# Patient Record
Sex: MALE | Employment: UNEMPLOYED
[De-identification: names, ages, dates, MRNs, and addresses within clinical notes are randomized per-mention and may not be internally consistent; named-entity substitution may affect disease eponyms.]

---

## 2022-07-30 ENCOUNTER — TELEPHONE ENCOUNTER (OUTPATIENT)
Age: 72
End: 2022-07-30

## 2022-07-31 ENCOUNTER — TELEPHONE ENCOUNTER (OUTPATIENT)
Age: 72
End: 2022-07-31

## 2023-06-23 LAB — URINE CULTURE: NO GROWTH

## 2023-06-24 LAB — STAPH/MRSA SCREEN, CULTURE: ABNORMAL

## 2023-07-06 ENCOUNTER — HOSPITAL ENCOUNTER (OUTPATIENT)
Dept: DATA CONVERSION | Facility: HOSPITAL | Age: 73
End: 2023-07-06
Attending: UROLOGY | Admitting: UROLOGY
Payer: MEDICARE

## 2023-07-06 DIAGNOSIS — N48.6 INDURATION PENIS PLASTICA: ICD-10-CM

## 2023-07-06 DIAGNOSIS — K21.9 GASTRO-ESOPHAGEAL REFLUX DISEASE WITHOUT ESOPHAGITIS: ICD-10-CM

## 2023-07-06 DIAGNOSIS — N52.9 MALE ERECTILE DYSFUNCTION, UNSPECIFIED: ICD-10-CM

## 2023-07-06 DIAGNOSIS — E78.5 HYPERLIPIDEMIA, UNSPECIFIED: ICD-10-CM

## 2023-07-06 DIAGNOSIS — I10 ESSENTIAL (PRIMARY) HYPERTENSION: ICD-10-CM

## 2023-08-28 PROBLEM — H90.0 CONDUCTIVE HEARING LOSS, BILATERAL: Status: ACTIVE | Noted: 2023-08-28

## 2023-08-28 PROBLEM — K57.30 DIVERTICULOSIS OF LARGE INTESTINE: Status: ACTIVE | Noted: 2023-08-28

## 2023-08-28 PROBLEM — E78.5 HLD (HYPERLIPIDEMIA): Status: ACTIVE | Noted: 2023-08-28

## 2023-08-28 PROBLEM — R73.01 IMPAIRED FASTING GLUCOSE: Status: ACTIVE | Noted: 2023-08-28

## 2023-08-28 PROBLEM — E78.00 PURE HYPERCHOLESTEROLEMIA: Status: ACTIVE | Noted: 2023-08-28

## 2023-08-28 PROBLEM — R13.19 ESOPHAGEAL DYSPHAGIA: Status: ACTIVE | Noted: 2023-08-28

## 2023-08-28 PROBLEM — J31.0 CHRONIC RHINITIS: Status: ACTIVE | Noted: 2023-08-28

## 2023-08-28 PROBLEM — N52.9 ERECTILE DYSFUNCTION: Status: ACTIVE | Noted: 2023-08-28

## 2023-08-28 PROBLEM — I10 ESSENTIAL HYPERTENSION: Status: ACTIVE | Noted: 2023-08-28

## 2023-08-28 PROBLEM — N48.6 PEYRONIE DISEASE: Status: ACTIVE | Noted: 2023-08-28

## 2023-08-28 PROBLEM — H61.23 BILATERAL IMPACTED CERUMEN: Status: ACTIVE | Noted: 2023-08-28

## 2023-08-28 PROBLEM — K21.00 GASTRO-ESOPHAGEAL REFLUX DISEASE WITH ESOPHAGITIS, WITHOUT BLEEDING: Status: ACTIVE | Noted: 2023-08-28

## 2023-08-28 RX ORDER — AMLODIPINE BESYLATE 5 MG/1
1 TABLET ORAL DAILY
COMMUNITY
Start: 2021-10-06 | End: 2023-11-01 | Stop reason: ALTCHOICE

## 2023-08-28 RX ORDER — TADALAFIL 20 MG/1
1 TABLET ORAL
COMMUNITY
Start: 2021-06-17 | End: 2023-11-01 | Stop reason: ALTCHOICE

## 2023-08-28 RX ORDER — AZELASTINE 1 MG/ML
1 SPRAY, METERED NASAL 2 TIMES DAILY
COMMUNITY
Start: 2023-04-26

## 2023-08-28 RX ORDER — PRAVASTATIN SODIUM 40 MG/1
1 TABLET ORAL NIGHTLY
COMMUNITY
End: 2024-01-25

## 2023-08-28 RX ORDER — LOSARTAN POTASSIUM AND HYDROCHLOROTHIAZIDE 25; 100 MG/1; MG/1
1 TABLET ORAL DAILY
COMMUNITY
End: 2023-11-01 | Stop reason: ALTCHOICE

## 2023-08-28 RX ORDER — FLUTICASONE PROPIONATE 50 MCG
1 SPRAY, SUSPENSION (ML) NASAL DAILY
COMMUNITY
Start: 2021-10-06

## 2023-08-28 RX ORDER — DIPHENHYDRAMINE HCL 25 MG
1 TABLET ORAL NIGHTLY
COMMUNITY
Start: 2021-03-29 | End: 2023-11-01 | Stop reason: ALTCHOICE

## 2023-08-28 RX ORDER — TADALAFIL 5 MG/1
1 TABLET ORAL DAILY
COMMUNITY
Start: 2023-05-18 | End: 2023-11-01 | Stop reason: ALTCHOICE

## 2023-08-28 RX ORDER — LORATADINE 10 MG/1
1 TABLET ORAL DAILY
COMMUNITY

## 2023-08-28 RX ORDER — OMEPRAZOLE 20 MG/1
1 CAPSULE, DELAYED RELEASE ORAL DAILY
COMMUNITY
End: 2024-01-25

## 2023-08-28 RX ORDER — OMEPRAZOLE 40 MG/1
1 CAPSULE, DELAYED RELEASE ORAL DAILY
COMMUNITY
Start: 2021-03-29 | End: 2023-11-01 | Stop reason: ALTCHOICE

## 2023-08-28 RX ORDER — AMLODIPINE BESYLATE 2.5 MG/1
10 TABLET ORAL DAILY
COMMUNITY
Start: 2023-01-20 | End: 2023-11-01 | Stop reason: ALTCHOICE

## 2023-09-29 VITALS — HEIGHT: 70 IN | WEIGHT: 194.45 LBS | BODY MASS INDEX: 27.84 KG/M2

## 2023-09-30 NOTE — H&P
History & Physical Reviewed:   I have reviewed the History and Physical dated:  06-Jul-2023   History and Physical reviewed and relevant findings noted. Patient examined to review pertinent physical  findings.: No significant changes   Home Medications Reviewed: no changes noted   Allergies Reviewed: no changes noted       ERAS (Enhanced Recovery After Surgery):  ·  ERAS Patient: no     Consent:   COVID-19 Consent:  ·  COVID-19 Risk Consent Surgeon has reviewed key risks related to the risk of zunilda COVID-19 and if they contract COVID-19 what the risks are.     Attestation:   Note Completion:  I am a:  Resident/Fellow   Attending Attestation I saw and evaluated the patient.  I personally obtained the key and critical portions of the history and physical exam or was physically present for key and  critical portions performed by the resident/fellow. I reviewed the resident/fellow?s documentation and discussed the patient with the resident/fellow.  I agree with the resident/fellow?s medical decision making as documented in the note.     I personally evaluated the patient on 06-Jul-2023         Electronic Signatures:  José Antonio Mayer (Resident))  (Signed 06-Jul-2023 06:51)   Authored: History & Physical Reviewed, ERAS, Consent,  Note Completion  Ashely Duckworth)  (Signed 06-Jul-2023 19:19)   Authored: Note Completion   Co-Signer: History & Physical Reviewed, ERAS, Consent, Note Completion      Last Updated: 06-Jul-2023 19:19 by Ashely Duckworth)

## 2023-10-02 NOTE — OP NOTE
PROCEDURE DETAILS    Preoperative Diagnosis:  erectile dysfunction  Peyronies disease   Postoperative Diagnosis:  erectile dysfunction  peyronies disease   Surgeon: kyra  Resident/Fellow/Other Assistant: quyen    Procedure:  infrapubic inflatable penile prosthesis placement  (Coloplast Titan)  Penile injection for artificial erection  Anesthesia: lma  Estimated Blood Loss: 5  Blood Replaced: none  Findings: well seated and aesthetic placement of infrapubic IPPand reervoir  Specimens(s) Collected: no,     Complications: none  IV Fluids: per anesthesia  Urine Output: 400cc  Drains and/or Catheters: flat MARLON to right suprapubic region  Implants: 18 + 1 RTE bilaterally / 125cc (with/75cc) reservoir in left space of retzius  Additional Details: pt to be discharged  home today  to void in pacu prior to discharge  home with MARLON drain and f/u Monday for drain removal  Patient Returned To/Condition: pacu        Operative Report:   PREOPERATIVE DIAGNOSIS: Erectile Dysfunction  POSTOPERATIVE DIAGNOSIS: Erectile Dysfunction    PROCEDURE:  Placement of Coloplast Titan 3-piece inflatable penile prosthesis.     Cylinders:  18 cm     Rear tips: 1 cm bilaterally     Reservoir: 125 mL    Site: left space of Retzius    INDICATIONS:  The patient is a 73 year old male with a history of severe vasculogenic erectile dysfunction refractory to conservative management with PDE 5 inhibitors, VCD, and intracavernosal therapy. Patient decided to undergo definitive surgical management  with inflatable penile prosthesis. The risks (infection, erosion, decrease penile size, damage to other organs, device mechanical failure and need for replacement, etc) and benefits were explained and all questions and concerns were addressed.    OPERATIVE DETAILS:  The patient was placed on table in supine position.  Anesthesia was administered without incident.  Genitalia was then shaved and then prepped with ChloraPrep after a Hibiclens  wash.  A bilateral pudendal nerve block was performed with 20 cc of 0.25%  Marcaine and decadron mix. An artificial erection was then induced using injection of lidocaine and injectable saline.  Left lateral curvature was noted.       A 16 fr villalpando catheter was then placed.   A transverse infrapubic incision was then made using a scalpel and electrocautery and dissected down through dartos down to the corporal bodies bilaterally which were cleared bluntly. 2-0 vicryl stay sutures  were placed in each corporal body, 1 medially and 1 laterally assuring that we were lateral to the dorsal nerve bundles.  A 1 cm corporotomy was then made between the stay stitches first on the left side and then on the right.  We then dilated the corpora  using the anup proximally and distally and dilated quite nicely. Using the Anup device, we then measured our corpora on the left side, it was 11 cm proximal and 8 distal for a total of 19 cm. On the right side, it was 10 cm proximal and 9 cm distal  for a total of 19 cm.  The wound was then copiously irrigated with Irricept irrigation.    Incision was then made to prep the coloplast titan 18 cm implant with 1 cm rear-tip extenders as well as 125cc reservoir.    We then turned our attention to development of the space for the reservoir. The decision was then to go on the left side in the space of retzius position. We then identified the external ring.  The appendiceal retractor was then placed in the left external  ring, and we were able to bluntly dissect into the space of retzius.  We were able to get a nice high 125 cc reservoir was then placed in this position.  It was filled with 75cc and seated quite nicely.    We then turned our attention to placement of the cylinders.  Once they had been prepped using Braxton needle and Anup device, the cylinders were deployed within the corporal bodies and it seated quite nicely.  A surrogate reservoir test was then performed  and tips were  symmetrical in mid glans.  No evidence of any crossover perforation or any other pathology.  It seated quite nicely.  Implant was then deflated.  The tubing from the reservoir was connected to the pump using the quick connector set.  Our  pre-placed corporotomy stitches were then tied down with nice closure of the corpora.  A subdartos pouch was created for placement of the pump. The pump was placed in a dependent position. A MARLON drain was placed into the left groin.  We then proceeded  with wound closure.  The deeper layers were closed with 2-0 Vicryl.  The skin was closed with subcuticular 4-0 Monocryl.  Skin glue was then applied. An island dressing, drain dressing was then applied, followed by fluffs and scrotal support.  The patient's  Aguilar catheter was removed.    The patient was awakened and transferred to PACU in stable condition.  .                        Attestation:   Note Completion:  Attending Attestation I was present for the entire procedure    I am a: Resident/Fellow         Electronic Signatures:  José Antonio Mayer (Resident))  (Signed 06-Jul-2023 12:23)   Authored: Post-Operative Note, Chart Review, Note Completion  Ashely Duckworth)  (Signed 06-Jul-2023 19:55)   Authored: Post-Operative Note, Chart Review, Note Completion   Co-Signer: Post-Operative Note, Chart Review, Note Completion      Last Updated: 06-Jul-2023 19:55 by Ashely Duckworth)

## 2023-11-01 ENCOUNTER — OFFICE VISIT (OUTPATIENT)
Dept: PRIMARY CARE | Facility: CLINIC | Age: 73
End: 2023-11-01
Payer: MEDICARE

## 2023-11-01 VITALS
DIASTOLIC BLOOD PRESSURE: 91 MMHG | TEMPERATURE: 97.5 F | HEIGHT: 71 IN | WEIGHT: 201 LBS | HEART RATE: 85 BPM | SYSTOLIC BLOOD PRESSURE: 146 MMHG | BODY MASS INDEX: 28.14 KG/M2 | OXYGEN SATURATION: 96 %

## 2023-11-01 DIAGNOSIS — R73.01 IMPAIRED FASTING BLOOD SUGAR: ICD-10-CM

## 2023-11-01 DIAGNOSIS — E78.00 PURE HYPERCHOLESTEROLEMIA: ICD-10-CM

## 2023-11-01 DIAGNOSIS — I10 BENIGN HYPERTENSION: Primary | ICD-10-CM

## 2023-11-01 DIAGNOSIS — Z12.5 SPECIAL SCREENING, PROSTATE CANCER: ICD-10-CM

## 2023-11-01 DIAGNOSIS — K21.00 GASTRO-ESOPHAGEAL REFLUX DISEASE WITH ESOPHAGITIS, WITHOUT BLEEDING: ICD-10-CM

## 2023-11-01 DIAGNOSIS — Z11.59 ENCOUNTER FOR HEPATITIS C SCREENING TEST FOR LOW RISK PATIENT: ICD-10-CM

## 2023-11-01 PROCEDURE — 1036F TOBACCO NON-USER: CPT | Performed by: INTERNAL MEDICINE

## 2023-11-01 PROCEDURE — 3077F SYST BP >= 140 MM HG: CPT | Performed by: INTERNAL MEDICINE

## 2023-11-01 PROCEDURE — 99214 OFFICE O/P EST MOD 30 MIN: CPT | Performed by: INTERNAL MEDICINE

## 2023-11-01 PROCEDURE — 1126F AMNT PAIN NOTED NONE PRSNT: CPT | Performed by: INTERNAL MEDICINE

## 2023-11-01 PROCEDURE — 3080F DIAST BP >= 90 MM HG: CPT | Performed by: INTERNAL MEDICINE

## 2023-11-01 PROCEDURE — 1159F MED LIST DOCD IN RCRD: CPT | Performed by: INTERNAL MEDICINE

## 2023-11-01 RX ORDER — LOSARTAN POTASSIUM AND HYDROCHLOROTHIAZIDE 25; 100 MG/1; MG/1
1 TABLET ORAL DAILY
COMMUNITY
End: 2024-03-07

## 2023-11-01 RX ORDER — TRAMADOL HYDROCHLORIDE 50 MG/1
50 TABLET ORAL EVERY 6 HOURS PRN
COMMUNITY
Start: 2023-07-26 | End: 2023-11-01 | Stop reason: ALTCHOICE

## 2023-11-01 RX ORDER — POLYETHYLENE GLYCOL 3350 17 G/17G
POWDER, FOR SOLUTION ORAL
COMMUNITY
Start: 2023-07-06 | End: 2023-11-01 | Stop reason: ALTCHOICE

## 2023-11-01 RX ORDER — AMLODIPINE BESYLATE 10 MG/1
10 TABLET ORAL DAILY
Qty: 90 TABLET | Refills: 2 | Status: SHIPPED | OUTPATIENT
Start: 2023-11-01 | End: 2024-10-31

## 2023-11-01 RX ORDER — AMLODIPINE BESYLATE 5 MG/1
TABLET ORAL DAILY
COMMUNITY
End: 2023-11-01 | Stop reason: DRUGHIGH

## 2023-11-01 ASSESSMENT — ENCOUNTER SYMPTOMS
SHORTNESS OF BREATH: 0
DEPRESSION: 0
OCCASIONAL FEELINGS OF UNSTEADINESS: 0
PALPITATIONS: 0
LOSS OF SENSATION IN FEET: 0

## 2023-11-01 ASSESSMENT — PATIENT HEALTH QUESTIONNAIRE - PHQ9
SUM OF ALL RESPONSES TO PHQ9 QUESTIONS 1 AND 2: 0
1. LITTLE INTEREST OR PLEASURE IN DOING THINGS: NOT AT ALL
2. FEELING DOWN, DEPRESSED OR HOPELESS: NOT AT ALL

## 2023-11-01 ASSESSMENT — PAIN SCALES - GENERAL: PAINLEVEL: 0-NO PAIN

## 2023-11-01 NOTE — PATIENT INSTRUCTIONS
Shingrix shingles vaccine series of 2  by 2 to 3 months, COVID 19 new vaccine 10-11/23.  Can be obtained at the local pharmacies.      Get labs done 1 week prior to follow up.    Diagnoses and all orders for this visit:  Benign hypertension  Comments:  Ideal BP  less than 130/80. Increase amlodipine 10 mg( low risk of cosntipation, feet/ankle swelling) usually well tolerated. Losartan/HCTZ 100/25 stays same.  Orders:  -     amLODIPine (Norvasc) 10 mg tablet; Take 1 tablet (10 mg) by mouth once daily.  -     Comprehensive Metabolic Panel; Future  Impaired fasting blood sugar  Comments:  Blood sugar 111, HGA1C 5.3%, low sugar diet. Recheck.  Orders:  -     CBC; Future  -     Comprehensive Metabolic Panel; Future  -     Hemoglobin A1C; Future  -     Hemoglobin A1C; Future  -     Comprehensive Metabolic Panel; Future  -     CBC; Future  Gastro-esophageal reflux disease with esophagitis, without bleeding  Comments:  Positional reflux, make sure not eating 3 hrs before bed.  Pure hypercholesterolemia  Comments:  LDL 84 4/23 Mediterranean diet.  Orders:  -     Lipid Panel; Future  Special screening, prostate cancer  -     Prostate Specific Antigen; Future  Encounter for hepatitis C screening test for low risk patient  -     Hepatitis C antibody; Future  Other orders  -     Follow Up In Primary Care - Medicare Annual; Future

## 2023-11-01 NOTE — PROGRESS NOTES
Left message to call back. [facility]: [facility]  PROGRESS NOTE      Lucius Busby is a 73 y.o. male that is presenting today for Hypertension (6 mo fu, acid reflux concerns).    Assessment/Plan   Diagnoses and all orders for this visit:  Benign hypertension  Comments:  Ideal BP  less than 130/80. Increase amlodipine 10 mg( low risk of cosntipation, feet/ankle swelling) usually well tolerated. Losartan/HCTZ 100/25 stays same.  Orders:  -     amLODIPine (Norvasc) 10 mg tablet; Take 1 tablet (10 mg) by mouth once daily.  -     Comprehensive Metabolic Panel; Future  Impaired fasting blood sugar  Comments:  Blood sugar 111, HGA1C 5.3%, low sugar diet. Recheck.  Orders:  -     CBC; Future  -     Comprehensive Metabolic Panel; Future  -     Hemoglobin A1C; Future  -     Hemoglobin A1C; Future  -     Comprehensive Metabolic Panel; Future  -     CBC; Future  Gastro-esophageal reflux disease with esophagitis, without bleeding  Comments:  Positional reflux, make sure not eating 3 hrs before bed.  Pure hypercholesterolemia  Comments:  LDL 84 4/23 Mediterranean diet.  Orders:  -     Lipid Panel; Future  Special screening, prostate cancer  -     Prostate Specific Antigen; Future  Encounter for hepatitis C screening test for low risk patient  -     Hepatitis C antibody; Future  Other orders  -     Follow Up In Primary Care - Medicare Annual; Future    Subjective   HTN, IFBS, GERD, anti reflux precautions reviwed, Vaccines discussed.    Review of Systems   Respiratory:  Negative for shortness of breath.    Cardiovascular:  Negative for chest pain and palpitations.   All other systems reviewed and are negative.     Objective   Vitals:    11/01/23 0958   BP: (!) 146/91   Pulse: 85   Temp: 36.4 °C (97.5 °F)   SpO2: 96%      Body mass index is 28.43 kg/m².  Physical Exam  Constitutional:       General: He is not in acute distress.  HENT:      Head: Normocephalic and atraumatic.      Right Ear: Tympanic membrane normal.      Left Ear:  "Tympanic membrane normal.      Mouth/Throat:      Mouth: Mucous membranes are moist.      Pharynx: Oropharynx is clear.   Eyes:      Extraocular Movements: Extraocular movements intact.      Conjunctiva/sclera: Conjunctivae normal.      Pupils: Pupils are equal, round, and reactive to light.   Cardiovascular:      Rate and Rhythm: Normal rate and regular rhythm.   Pulmonary:      Breath sounds: Normal breath sounds.   Abdominal:      General: Bowel sounds are normal.      Palpations: Abdomen is soft. There is no mass.   Musculoskeletal:         General: Normal range of motion.      Cervical back: Neck supple. No tenderness.   Skin:     General: Skin is warm and dry.   Neurological:      General: No focal deficit present.      Mental Status: He is oriented to person, place, and time.     Diagnostic Results   Lab Results   Component Value Date    GLUCOSE 111 (H) 04/18/2023    CALCIUM 9.2 04/18/2023     04/18/2023    K 3.7 04/18/2023    CO2 28 04/18/2023     04/18/2023    BUN 16 04/18/2023    CREATININE 1.0 04/18/2023     Lab Results   Component Value Date    ALT 16 04/18/2023    AST 14 04/18/2023    ALKPHOS 65 04/18/2023    BILITOT 0.4 04/18/2023     Lab Results   Component Value Date    WBC 7.7 04/18/2023    HGB 15.3 04/18/2023    HCT 43.7 04/18/2023    MCV 86.5 04/18/2023     04/18/2023     Lab Results   Component Value Date    CHOL 144 04/18/2023    CHOL 177 10/04/2022    CHOL 178 04/01/2022     Lab Results   Component Value Date    HDL 35 (L) 04/18/2023    HDL 37 (L) 10/04/2022    HDL 38 (L) 04/01/2022     Lab Results   Component Value Date    LDLCALC 84 04/18/2023    LDLCALC 118 10/04/2022    LDLCALC 116 04/01/2022     Lab Results   Component Value Date    TRIG 123 04/18/2023    TRIG 112 10/04/2022    TRIG 121 04/01/2022     No components found for: \"CHOLHDL\"  Lab Results   Component Value Date    HGBA1C 5.3 04/18/2023     Other labs not included in the list above were reviewed either before or " during this encounter.    History    Past Medical History:   Diagnosis Date    Erectile dysfunction 08/28/2023    Essential hypertension 08/28/2023 4/23 ECG NSR    Gastro-esophageal reflux disease with esophagitis, without bleeding 08/28/2023    EGD 10/22  PPI.    Impaired fasting glucose 08/28/2023    Personal history of other diseases of the circulatory system     History of hypertension    Peyronie disease 08/28/2023    Pure hypercholesterolemia 08/28/2023    Screening for prostate cancer     10/22 PSA 2.8 DrGhandour     History reviewed. No pertinent surgical history.  Family History   Problem Relation Name Age of Onset    Heart disease Mother      Heart attack Father      No Known Problems Daughter      No Known Problems Sibling          1 brother 1 sister     Social History     Socioeconomic History    Marital status:      Spouse name: Not on file    Number of children: Not on file    Years of education: Not on file    Highest education level: Not on file   Occupational History    Not on file   Tobacco Use    Smoking status: Never     Passive exposure: Never    Smokeless tobacco: Never   Vaping Use    Vaping Use: Never used   Substance and Sexual Activity    Alcohol use: Yes    Drug use: Never    Sexual activity: Not on file   Other Topics Concern    Not on file   Social History Narrative    Not on file     Social Determinants of Health     Financial Resource Strain: Not on file   Food Insecurity: Not on file   Transportation Needs: Not on file   Physical Activity: Not on file   Stress: Not on file   Social Connections: Not on file   Intimate Partner Violence: Not on file   Housing Stability: Not on file     No Known Allergies  Current Outpatient Medications on File Prior to Visit   Medication Sig Dispense Refill    amLODIPine (Norvasc) 2.5 mg tablet Take 1 tablet (2.5 mg) by mouth once daily.      azelastine (Astelin) 137 mcg (0.1 %) nasal spray Administer 1 spray into each nostril 2  times a day.      fluticasone (Flonase) 50 mcg/actuation nasal spray Administer 1 spray into each nostril once daily.      loratadine (Claritin) 10 mg tablet Take 1 tablet (10 mg) by mouth once daily.      losartan-hydrochlorothiazide (Hyzaar) 100-25 mg tablet Take 1 tablet by mouth once daily.      omeprazole (PriLOSEC) 20 mg DR capsule Take 1 capsule (20 mg) by mouth once daily. 30 minutes before morning meal      pravastatin (Pravachol) 40 mg tablet Take 1 tablet (40 mg) by mouth once daily at bedtime.      psyllium husk (METAMUCIL ORAL) Take 1 tablet by mouth.      [DISCONTINUED] polyethylene glycol (Glycolax, Miralax) 17 gram/dose powder mix and drink 17 gram(s) by mouth once a day      [DISCONTINUED] traMADol (Ultram) 50 mg tablet Take 1 tablet (50 mg) by mouth every 6 hours if needed.      [DISCONTINUED] amLODIPine (Norvasc) 5 mg tablet Take 1 tablet (5 mg) by mouth once daily.      [DISCONTINUED] diphenhydrAMINE (Benadryl Allergy) 25 mg tablet Take 1 tablet (25 mg) by mouth once daily at bedtime.      [DISCONTINUED] losartan-hydrochlorothiazide (Hyzaar) 100-25 mg tablet Take 1 tablet by mouth once daily.      [DISCONTINUED] omeprazole (PriLOSEC) 40 mg DR capsule Take 1 capsule (40 mg) by mouth once daily.      [DISCONTINUED] tadalafil (Cialis) 5 mg tablet Take 1 tablet (5 mg) by mouth once daily.      [DISCONTINUED] tadalafil 20 mg tablet Take 1 tablet (20 mg) by mouth. one hour prior to intercourse. Do not exceed two tablets per week.       No current facility-administered medications on file prior to visit.     Immunization History   Administered Date(s) Administered    Flu vaccine (IIV4), preservative free *Check age/dose* 11/20/2020    Flu vaccine, quadrivalent, high-dose, preservative free, age 65y+ (FLUZONE) 10/06/2021, 10/06/2022    Influenza, seasonal, injectable 11/17/2010    Influenza, seasonal, injectable, preservative free 11/20/2013, 10/24/2014    Novel influenza-H1N1-09, preservative-free  12/17/2009    Pfizer COVID-19 vaccine, bivalent, age 12 years and older (30 mcg/0.3 mL) 01/11/2023    Pfizer Purple Cap SARS-CoV-2 03/02/2021, 03/30/2021, 10/11/2021    Pneumococcal polysaccharide vaccine, 23-valent, age 2 years and older (PNEUMOVAX 23) 10/01/2018, 01/10/2019    Zoster, live 12/04/2014     Patient's medical history was reviewed and updated either before or during this encounter.    Kehinde Henderson MD

## 2023-12-04 ENCOUNTER — OFFICE VISIT (OUTPATIENT)
Dept: UROLOGY | Facility: HOSPITAL | Age: 73
End: 2023-12-04
Payer: MEDICARE

## 2023-12-04 DIAGNOSIS — N52.8 OTHER MALE ERECTILE DYSFUNCTION: Primary | ICD-10-CM

## 2023-12-04 PROCEDURE — 99213 OFFICE O/P EST LOW 20 MIN: CPT | Performed by: UROLOGY

## 2023-12-04 PROCEDURE — 1159F MED LIST DOCD IN RCRD: CPT | Performed by: UROLOGY

## 2023-12-04 PROCEDURE — 1126F AMNT PAIN NOTED NONE PRSNT: CPT | Performed by: UROLOGY

## 2023-12-04 PROCEDURE — 1036F TOBACCO NON-USER: CPT | Performed by: UROLOGY

## 2023-12-04 NOTE — PROGRESS NOTES
"Virtual or Telephone Consent    Last visit8/2023   S/P IPP 07/06/23.   -Infrapubic incision well-healing.   -Encouraged daily inflation and deflation of pump.   ok to have sex    Today's visit:    #Erectile Dysfunction   S/P IPP 07/06/23.   -Healing well.   -States that area is very sore and negatively impacts his sleeping.   -No side effects.   -No hematuria.   -some pain with sex  Having trouble deflating, but getting better  Wife having some issues      Labs  No results found for: \"TESTOSTERONE\"  No results found for: \"LH\"  No results found for: \"FSH\"  No components found for: \"ESTRADIAL\"  Lab Results   Component Value Date    PSA 2.8 10/04/2022     No components found for: \"CBC\"  No results found for: \"PROLACTIN\"  Lab Results   Component Value Date    HGBA1C 5.3 04/18/2023         PMH:  Past Medical History:   Diagnosis Date    Erectile dysfunction 08/28/2023    implant     Essential hypertension 08/28/2023 4/23 ECG NSR    Gastro-esophageal reflux disease with esophagitis, without bleeding 08/28/2023    EGD 10/22  PPI.    Impaired fasting glucose 08/28/2023    Personal history of other diseases of the circulatory system     History of hypertension    Peyronie disease 08/28/2023    Pure hypercholesterolemia 08/28/2023    Screening for prostate cancer     10/22 PSA 2.8 Keanu Castaneda 12/23 [facility]        PSH:  No past surgical history on file.     Medications:    Current Outpatient Medications:     amLODIPine (Norvasc) 10 mg tablet, Take 1 tablet (10 mg) by mouth once daily., Disp: 90 tablet, Rfl: 2    azelastine (Astelin) 137 mcg (0.1 %) nasal spray, Administer 1 spray into each nostril 2 times a day., Disp: , Rfl:     fluticasone (Flonase) 50 mcg/actuation nasal spray, Administer 1 spray into each nostril once daily., Disp: , Rfl:     loratadine (Claritin) 10 mg tablet, Take 1 tablet (10 mg) by mouth once daily., Disp: , Rfl:     losartan-hydrochlorothiazide (Hyzaar) 100-25 mg " tablet, Take 1 tablet by mouth once daily., Disp: , Rfl:     omeprazole (PriLOSEC) 20 mg DR capsule, Take 1 capsule (20 mg) by mouth once daily. 30 minutes before morning meal, Disp: , Rfl:     pravastatin (Pravachol) 40 mg tablet, Take 1 tablet (40 mg) by mouth once daily at bedtime., Disp: , Rfl:     psyllium husk (METAMUCIL ORAL), Take 1 tablet by mouth., Disp: , Rfl:     Allergy:  No Known Allergies     Exam  Implant: tips properly placed, inflates and deflates properly   Pump in place, nonfixed, able to inflate and deflate   No signs of infection erosion or extrusion      Assessment/Plan  #Severe erectile dysfunction/Peyronie's Disease - refractory to pills and injections - S/P IPP 07/06/23.   -healing well  -Dr. Olson info given for wife  -offered visit with Dr. Velasquez  -continue exercising with inflating and deflating    Fu prn

## 2023-12-13 ENCOUNTER — LAB (OUTPATIENT)
Dept: LAB | Facility: LAB | Age: 73
End: 2023-12-13
Payer: MEDICARE

## 2023-12-13 DIAGNOSIS — Z12.5 SPECIAL SCREENING, PROSTATE CANCER: ICD-10-CM

## 2023-12-13 DIAGNOSIS — R73.01 IMPAIRED FASTING BLOOD SUGAR: ICD-10-CM

## 2023-12-13 DIAGNOSIS — Z11.59 ENCOUNTER FOR HEPATITIS C SCREENING TEST FOR LOW RISK PATIENT: ICD-10-CM

## 2023-12-13 DIAGNOSIS — E78.00 PURE HYPERCHOLESTEROLEMIA: ICD-10-CM

## 2023-12-13 LAB
ALBUMIN SERPL-MCNC: 4.4 G/DL (ref 3.5–5)
ALP BLD-CCNC: 85 U/L (ref 35–125)
ALT SERPL-CCNC: 14 U/L (ref 5–40)
ANION GAP SERPL CALC-SCNC: 12 MMOL/L
AST SERPL-CCNC: 16 U/L (ref 5–40)
BILIRUB SERPL-MCNC: 0.5 MG/DL (ref 0.1–1.2)
BUN SERPL-MCNC: 17 MG/DL (ref 8–25)
CALCIUM SERPL-MCNC: 9.8 MG/DL (ref 8.5–10.4)
CHLORIDE SERPL-SCNC: 98 MMOL/L (ref 97–107)
CHOLEST SERPL-MCNC: 167 MG/DL (ref 133–200)
CHOLEST/HDLC SERPL: 4.3 {RATIO}
CO2 SERPL-SCNC: 29 MMOL/L (ref 24–31)
CREAT SERPL-MCNC: 1.1 MG/DL (ref 0.4–1.6)
ERYTHROCYTE [DISTWIDTH] IN BLOOD BY AUTOMATED COUNT: 13.5 % (ref 11.5–14.5)
EST. AVERAGE GLUCOSE BLD GHB EST-MCNC: 117 MG/DL
GFR SERPL CREATININE-BSD FRML MDRD: 72 ML/MIN/1.73M*2
GLUCOSE SERPL-MCNC: 106 MG/DL (ref 65–99)
HBA1C MFR BLD: 5.7 %
HCT VFR BLD AUTO: 48.1 % (ref 41–52)
HDLC SERPL-MCNC: 39 MG/DL
HGB BLD-MCNC: 16.2 G/DL (ref 13.5–17.5)
LDLC SERPL CALC-MCNC: 100 MG/DL (ref 65–130)
MCH RBC QN AUTO: 29.5 PG (ref 26–34)
MCHC RBC AUTO-ENTMCNC: 33.7 G/DL (ref 32–36)
MCV RBC AUTO: 88 FL (ref 80–100)
NRBC BLD-RTO: 0 /100 WBCS (ref 0–0)
PLATELET # BLD AUTO: 428 X10*3/UL (ref 150–450)
POTASSIUM SERPL-SCNC: 3.8 MMOL/L (ref 3.4–5.1)
PROT SERPL-MCNC: 7.3 G/DL (ref 5.9–7.9)
PSA SERPL-MCNC: 4 NG/ML
RBC # BLD AUTO: 5.49 X10*6/UL (ref 4.5–5.9)
SODIUM SERPL-SCNC: 139 MMOL/L (ref 133–145)
TRIGL SERPL-MCNC: 140 MG/DL (ref 40–150)
WBC # BLD AUTO: 8.7 X10*3/UL (ref 4.4–11.3)

## 2023-12-13 PROCEDURE — 80053 COMPREHEN METABOLIC PANEL: CPT

## 2023-12-13 PROCEDURE — G0103 PSA SCREENING: HCPCS

## 2023-12-13 PROCEDURE — 80061 LIPID PANEL: CPT

## 2023-12-13 PROCEDURE — 86803 HEPATITIS C AB TEST: CPT

## 2023-12-13 PROCEDURE — 36415 COLL VENOUS BLD VENIPUNCTURE: CPT

## 2023-12-13 PROCEDURE — 85027 COMPLETE CBC AUTOMATED: CPT

## 2023-12-13 PROCEDURE — 83036 HEMOGLOBIN GLYCOSYLATED A1C: CPT

## 2023-12-14 ENCOUNTER — TELEPHONE (OUTPATIENT)
Dept: PRIMARY CARE | Facility: CLINIC | Age: 73
End: 2023-12-14
Payer: MEDICARE

## 2023-12-14 DIAGNOSIS — R97.20 ELEVATED PSA: Primary | ICD-10-CM

## 2023-12-14 LAB — HCV AB SER QL: NONREACTIVE

## 2023-12-14 NOTE — TELEPHONE ENCOUNTER
LABs OK, PSA 4.0. see  urology last PSA was 2.8, Would recommend Recheck PSA 2/24 if still increased then urology follow up.

## 2024-01-25 DIAGNOSIS — E78.00 PURE HYPERCHOLESTEROLEMIA: ICD-10-CM

## 2024-01-25 DIAGNOSIS — K21.00 GASTRO-ESOPHAGEAL REFLUX DISEASE WITH ESOPHAGITIS, WITHOUT BLEEDING: ICD-10-CM

## 2024-01-25 RX ORDER — PRAVASTATIN SODIUM 40 MG/1
40 TABLET ORAL NIGHTLY
Qty: 90 TABLET | Refills: 0 | Status: SHIPPED | OUTPATIENT
Start: 2024-01-25 | End: 2024-04-30

## 2024-01-25 RX ORDER — PRAVASTATIN SODIUM 40 MG/1
40 TABLET ORAL NIGHTLY
Qty: 90 TABLET | Refills: 0 | Status: SHIPPED | OUTPATIENT
Start: 2024-01-25 | End: 2024-01-25

## 2024-01-25 RX ORDER — OMEPRAZOLE 20 MG/1
CAPSULE, DELAYED RELEASE ORAL
Qty: 90 CAPSULE | Refills: 0 | Status: SHIPPED | OUTPATIENT
Start: 2024-01-25 | End: 2024-02-22 | Stop reason: ALTCHOICE

## 2024-02-12 ENCOUNTER — LAB (OUTPATIENT)
Dept: LAB | Facility: LAB | Age: 74
End: 2024-02-12
Payer: MEDICARE

## 2024-02-12 DIAGNOSIS — R97.20 ELEVATED PSA: ICD-10-CM

## 2024-02-12 PROCEDURE — 84153 ASSAY OF PSA TOTAL: CPT

## 2024-02-12 PROCEDURE — 84154 ASSAY OF PSA FREE: CPT

## 2024-02-12 PROCEDURE — 36415 COLL VENOUS BLD VENIPUNCTURE: CPT

## 2024-02-15 LAB
PSA FREE MFR SERPL: 35 %
PSA FREE SERPL-MCNC: 0.8 NG/ML
PSA SERPL IA-MCNC: 2.3 NG/ML (ref 0–4)

## 2024-02-20 PROBLEM — E78.5 HYPERLIPIDEMIA: Status: ACTIVE | Noted: 2018-07-10

## 2024-02-20 PROBLEM — H61.20 IMPACTED CERUMEN: Status: ACTIVE | Noted: 2024-02-20

## 2024-02-20 PROBLEM — R41.3 MEMORY IMPAIRMENT: Status: ACTIVE | Noted: 2018-07-10

## 2024-02-20 PROBLEM — I10 ESSENTIAL HYPERTENSION: Status: ACTIVE | Noted: 2018-07-10

## 2024-02-20 PROBLEM — K57.30 DIVERTICULOSIS OF LARGE INTESTINE: Status: ACTIVE | Noted: 2024-02-20

## 2024-02-20 PROBLEM — R73.01 IMPAIRED FASTING GLUCOSE: Status: ACTIVE | Noted: 2018-07-10

## 2024-02-20 PROBLEM — L50.8 ACUTE URTICARIA: Status: ACTIVE | Noted: 2024-02-20

## 2024-02-20 PROBLEM — J32.9 CHRONIC SINUSITIS: Status: ACTIVE | Noted: 2018-07-10

## 2024-02-20 PROBLEM — Z86.79 HISTORY OF HYPERTENSION: Status: ACTIVE | Noted: 2024-02-20

## 2024-02-20 PROBLEM — K21.9 GASTROESOPHAGEAL REFLUX DISEASE: Status: ACTIVE | Noted: 2018-07-10

## 2024-02-20 PROBLEM — G89.18 POSTOPERATIVE PAIN: Status: ACTIVE | Noted: 2024-02-20

## 2024-02-20 PROBLEM — R31.29 MICROSCOPIC HEMATURIA: Status: ACTIVE | Noted: 2020-01-17

## 2024-02-20 RX ORDER — MONTELUKAST SODIUM 10 MG/1
TABLET ORAL
COMMUNITY
Start: 2020-11-24 | End: 2024-02-22 | Stop reason: ALTCHOICE

## 2024-02-20 RX ORDER — SILDENAFIL 100 MG/1
TABLET, FILM COATED ORAL
COMMUNITY
Start: 2020-01-17 | End: 2024-02-22 | Stop reason: ALTCHOICE

## 2024-02-22 ENCOUNTER — OFFICE VISIT (OUTPATIENT)
Dept: PRIMARY CARE | Facility: CLINIC | Age: 74
End: 2024-02-22
Payer: MEDICARE

## 2024-02-22 VITALS
WEIGHT: 198 LBS | OXYGEN SATURATION: 95 % | BODY MASS INDEX: 27.72 KG/M2 | TEMPERATURE: 97.3 F | HEIGHT: 71 IN | SYSTOLIC BLOOD PRESSURE: 138 MMHG | DIASTOLIC BLOOD PRESSURE: 70 MMHG

## 2024-02-22 DIAGNOSIS — K21.00 GASTROESOPHAGEAL REFLUX DISEASE WITH ESOPHAGITIS WITHOUT HEMORRHAGE: Primary | ICD-10-CM

## 2024-02-22 DIAGNOSIS — J32.9 CHRONIC SINUSITIS, UNSPECIFIED LOCATION: ICD-10-CM

## 2024-02-22 DIAGNOSIS — R73.01 IMPAIRED FASTING GLUCOSE: ICD-10-CM

## 2024-02-22 PROBLEM — Z12.5 SCREENING FOR PROSTATE CANCER: Status: ACTIVE | Noted: 2024-02-22

## 2024-02-22 PROCEDURE — 99213 OFFICE O/P EST LOW 20 MIN: CPT | Performed by: INTERNAL MEDICINE

## 2024-02-22 PROCEDURE — 3078F DIAST BP <80 MM HG: CPT | Performed by: INTERNAL MEDICINE

## 2024-02-22 PROCEDURE — 1159F MED LIST DOCD IN RCRD: CPT | Performed by: INTERNAL MEDICINE

## 2024-02-22 PROCEDURE — 1036F TOBACCO NON-USER: CPT | Performed by: INTERNAL MEDICINE

## 2024-02-22 PROCEDURE — 1125F AMNT PAIN NOTED PAIN PRSNT: CPT | Performed by: INTERNAL MEDICINE

## 2024-02-22 PROCEDURE — 3075F SYST BP GE 130 - 139MM HG: CPT | Performed by: INTERNAL MEDICINE

## 2024-02-22 RX ORDER — PANTOPRAZOLE SODIUM 40 MG/1
40 TABLET, DELAYED RELEASE ORAL DAILY
Qty: 30 TABLET | Refills: 11 | Status: SHIPPED | OUTPATIENT
Start: 2024-02-22 | End: 2025-02-21

## 2024-02-22 RX ORDER — DIPHENHYDRAMINE HCL 25 MG
25 TABLET ORAL NIGHTLY PRN
COMMUNITY
End: 2024-05-08 | Stop reason: ALTCHOICE

## 2024-02-22 ASSESSMENT — PATIENT HEALTH QUESTIONNAIRE - PHQ9
1. LITTLE INTEREST OR PLEASURE IN DOING THINGS: NOT AT ALL
SUM OF ALL RESPONSES TO PHQ9 QUESTIONS 1 AND 2: 0
1. LITTLE INTEREST OR PLEASURE IN DOING THINGS: NOT AT ALL
2. FEELING DOWN, DEPRESSED OR HOPELESS: NOT AT ALL
2. FEELING DOWN, DEPRESSED OR HOPELESS: NOT AT ALL
SUM OF ALL RESPONSES TO PHQ9 QUESTIONS 1 AND 2: 0

## 2024-02-22 ASSESSMENT — ENCOUNTER SYMPTOMS
SHORTNESS OF BREATH: 0
PALPITATIONS: 0

## 2024-02-22 ASSESSMENT — PAIN SCALES - GENERAL: PAINLEVEL: 2

## 2024-02-22 NOTE — PROGRESS NOTES
[facility]: [facility]  PROGRESS NOTE      Lucius Busby is a 73 y.o. male that is presenting today for Sinus Problem (And upper respiratory issues).    Assessment/Plan   Diagnoses and all orders for this visit:  Gastroesophageal reflux disease with esophagitis without hemorrhage  Comments:  Stop omeprazole and switch to pantoprazole 40 mg daily. Antireflux precautions.  Orders:  -     pantoprazole (ProtoNix) 40 mg EC tablet; Take 1 tablet (40 mg) by mouth once daily. Do not crush, chew, or split.  Chronic sinusitis, unspecified location  Comments:  Saline sprays,  moisturize sinuses. Flonase daily.y. Hold Astelin nasal sal spray.  Impaired fasting glucose    Subjective   Cough, drink coffee. Sinus congestion, etc.,  Few months. Was on omeprazole 40 mg decreased to 20 mg. U/C COVID 19 pos. 1/24, felt better.  Some dryness sinuses. GERD worse.      Review of Systems   Respiratory:  Negative for shortness of breath.    Cardiovascular:  Negative for chest pain and palpitations.   All other systems reviewed and are negative.     Objective   Vitals:    02/22/24 1059   BP: 138/70   Temp: 36.3 °C (97.3 °F)   SpO2: 95%      Body mass index is 28.01 kg/m².  Physical Exam  Constitutional:       General: He is not in acute distress.  HENT:      Head: Normocephalic and atraumatic.      Right Ear: Tympanic membrane normal.      Left Ear: Tympanic membrane normal.      Mouth/Throat:      Mouth: Mucous membranes are moist.      Pharynx: Oropharynx is clear.   Eyes:      Extraocular Movements: Extraocular movements intact.      Conjunctiva/sclera: Conjunctivae normal.      Pupils: Pupils are equal, round, and reactive to light.   Cardiovascular:      Rate and Rhythm: Normal rate and regular rhythm.   Pulmonary:      Breath sounds: Normal breath sounds.   Abdominal:      General: Bowel sounds are normal.      Palpations: Abdomen is soft. There is no mass.   Musculoskeletal:         General: Normal range of  "motion.      Cervical back: Neck supple. No tenderness.   Skin:     General: Skin is warm and dry.   Neurological:      General: No focal deficit present.      Mental Status: He is oriented to person, place, and time.       Diagnostic Results   Lab Results   Component Value Date    GLUCOSE 106 (H) 12/13/2023    CALCIUM 9.8 12/13/2023     12/13/2023    K 3.8 12/13/2023    CO2 29 12/13/2023    CL 98 12/13/2023    BUN 17 12/13/2023    CREATININE 1.10 12/13/2023     Lab Results   Component Value Date    ALT 14 12/13/2023    AST 16 12/13/2023    ALKPHOS 85 12/13/2023    BILITOT 0.5 12/13/2023     Lab Results   Component Value Date    WBC 8.7 12/13/2023    HGB 16.2 12/13/2023    HCT 48.1 12/13/2023    MCV 88 12/13/2023     12/13/2023     Lab Results   Component Value Date    CHOL 167 12/13/2023    CHOL 144 04/18/2023    CHOL 177 10/04/2022     Lab Results   Component Value Date    HDL 39.0 (L) 12/13/2023    HDL 35 (L) 04/18/2023    HDL 37 (L) 10/04/2022     Lab Results   Component Value Date    LDLCALC 100 12/13/2023    LDLCALC 84 04/18/2023    LDLCALC 118 10/04/2022     Lab Results   Component Value Date    TRIG 140 12/13/2023    TRIG 123 04/18/2023    TRIG 112 10/04/2022     No components found for: \"CHOLHDL\"  Lab Results   Component Value Date    HGBA1C 5.7 (H) 12/13/2023     Other labs not included in the list above were reviewed either before or during this encounter.    History    Past Medical History:   Diagnosis Date    Erectile dysfunction 08/28/2023    implant     Essential hypertension 08/28/2023 4/23 ECG NSR    Gastro-esophageal reflux disease with esophagitis, without bleeding 08/28/2023    EGD 10/22  PPI.    Impaired fasting glucose 08/28/2023    Personal history of other diseases of the circulatory system     History of hypertension    Peyronie disease 08/28/2023    Pure hypercholesterolemia 08/28/2023    Screening for prostate cancer     10/22 PSA 2.8 [facility] "  12/23 [facility], 12/23 PSA 4.0 recheck 2/24 PSA/free PSA if inc ruology follow up.    Screening for prostate cancer 02/22/2024 2/24 PSA 2.3     History reviewed. No pertinent surgical history.  Family History   Problem Relation Name Age of Onset    Heart disease Mother      Heart attack Father      No Known Problems Daughter      No Known Problems Sibling          1 brother 1 sister     Social History     Socioeconomic History    Marital status:      Spouse name: Not on file    Number of children: Not on file    Years of education: Not on file    Highest education level: Not on file   Occupational History    Not on file   Tobacco Use    Smoking status: Never     Passive exposure: Never    Smokeless tobacco: Never   Vaping Use    Vaping Use: Never used   Substance and Sexual Activity    Alcohol use: Yes    Drug use: Never    Sexual activity: Not on file   Other Topics Concern    Not on file   Social History Narrative    Not on file     Social Determinants of Health     Financial Resource Strain: Not on file   Food Insecurity: Not on file   Transportation Needs: Not on file   Physical Activity: Not on file   Stress: Not on file   Social Connections: Not on file   Intimate Partner Violence: Not on file   Housing Stability: Not on file     Allergies   Allergen Reactions    Pollen Extracts Other    Grass Pollen Other     Current Outpatient Medications on File Prior to Visit   Medication Sig Dispense Refill    amLODIPine (Norvasc) 10 mg tablet Take 1 tablet (10 mg) by mouth once daily. 90 tablet 2    azelastine (Astelin) 137 mcg (0.1 %) nasal spray Administer 1 spray into each nostril 2 times a day.      diphenhydrAMINE (Sominex) 25 mg tablet Take 1 tablet (25 mg) by mouth as needed at bedtime for sleep.      fluticasone (Flonase) 50 mcg/actuation nasal spray Administer 1 spray into each nostril once daily.      loratadine (Claritin) 10 mg tablet Take 1 tablet (10 mg) by mouth once daily.       losartan-hydrochlorothiazide (Hyzaar) 100-25 mg tablet Take 1 tablet by mouth once daily.      pravastatin (Pravachol) 40 mg tablet TAKE 1 TABLET BY MOUTH AT NIGHT 90 tablet 0    psyllium husk (METAMUCIL ORAL) Take 1 tablet by mouth.      [DISCONTINUED] omeprazole (PriLOSEC) 20 mg DR capsule TAKE 1 CAPSULE BY MOUTH EVERY DAY 30 minutes before morning meal 90 capsule 0    [DISCONTINUED] sildenafil (Viagra) 100 mg tablet TAKE 1 TABLET (100 MG) BY ORAL ROUTE ONCE DAILY AS NEEDED APPROXIMATELY 1 HOUR BEFORE SEXUAL ACTIVITY      [DISCONTINUED] montelukast (Singulair) 10 mg tablet Take by mouth once daily.       No current facility-administered medications on file prior to visit.     Immunization History   Administered Date(s) Administered    Flu vaccine (IIV4), preservative free *Check age/dose* 11/20/2020    Flu vaccine, quadrivalent, high-dose, preservative free, age 65y+ (FLUZONE) 10/06/2021, 10/06/2022    Influenza, Seasonal, Quadrivalent, Adjuvanted 09/02/2023    Influenza, Unspecified 09/27/2018    Influenza, injectable, quadrivalent 12/08/2017, 12/01/2019, 11/17/2020    Influenza, seasonal, injectable 11/17/2010    Influenza, seasonal, injectable, preservative free 11/20/2013, 10/24/2014    Novel influenza-H1N1-09, preservative-free 12/17/2009    Pfizer COVID-19 vaccine, bivalent, age 12 years and older (30 mcg/0.3 mL) 01/11/2023    Pfizer Purple Cap SARS-CoV-2 03/02/2021, 03/30/2021, 10/11/2021    Pneumococcal conjugate vaccine, 13-valent (PREVNAR 13) 01/04/2018    Pneumococcal polysaccharide vaccine, 23-valent, age 2 years and older (PNEUMOVAX 23) 10/01/2018, 01/10/2019    RESPIRATORY SYNCYTIAL VIRUS (RSV), ELIGIBLE PREGNANT PTS, 0.5 ML (ABRYSVO) 09/02/2023    Tdap vaccine, age 7 year and older (BOOSTRIX, ADACEL) 12/01/2014    Zoster, live 12/04/2014, 01/01/2015     Patient's medical history was reviewed and updated either before or during this encounter.       Kehinde Henderson MD

## 2024-02-22 NOTE — PATIENT INSTRUCTIONS
Diagnoses and all orders for this visit:  Gastroesophageal reflux disease with esophagitis without hemorrhage  Comments:  Stop omeprazole and switch to pantoprazole 40 mg daily. Antireflux precautions.  Orders:  -     pantoprazole (ProtoNix) 40 mg EC tablet; Take 1 tablet (40 mg) by mouth once daily. Do not crush, chew, or split.  Chronic sinusitis, unspecified location  Comments:  Saline sprays,  moisturize sinuses. Flonase daily.y. Hold Astelin nasal sal spray.  Impaired fasting glucose

## 2024-02-29 ENCOUNTER — TELEPHONE (OUTPATIENT)
Dept: PRIMARY CARE | Facility: CLINIC | Age: 74
End: 2024-02-29

## 2024-02-29 ENCOUNTER — APPOINTMENT (OUTPATIENT)
Dept: PRIMARY CARE | Facility: CLINIC | Age: 74
End: 2024-02-29
Payer: MEDICARE

## 2024-02-29 DIAGNOSIS — J11.1 INFLUENZA: Primary | ICD-10-CM

## 2024-02-29 RX ORDER — OSELTAMIVIR PHOSPHATE 75 MG/1
75 CAPSULE ORAL 2 TIMES DAILY
Qty: 10 CAPSULE | Refills: 0 | Status: SHIPPED | OUTPATIENT
Start: 2024-02-29 | End: 2024-03-05

## 2024-02-29 NOTE — TELEPHONE ENCOUNTER
Spouse states pt having congestion, cough, bodyaches, yellow mucus, headache.  Symptoms started 2/26/24.      Spouse states she tested positive for influenza A today.      Advised to test for COVID and call office back.

## 2024-02-29 NOTE — TELEPHONE ENCOUNTER
Pt tested negative for COVID.  Asking for further instructions or Rx.  Please advise.    If Rx, [facility] [address], [place]

## 2024-03-07 DIAGNOSIS — I10 ESSENTIAL HYPERTENSION: ICD-10-CM

## 2024-03-07 RX ORDER — LOSARTAN POTASSIUM AND HYDROCHLOROTHIAZIDE 25; 100 MG/1; MG/1
1 TABLET ORAL DAILY
Qty: 90 TABLET | Refills: 0 | Status: SHIPPED | OUTPATIENT
Start: 2024-03-07 | End: 2024-03-12

## 2024-03-12 DIAGNOSIS — I10 ESSENTIAL HYPERTENSION: ICD-10-CM

## 2024-03-12 RX ORDER — LOSARTAN POTASSIUM AND HYDROCHLOROTHIAZIDE 25; 100 MG/1; MG/1
1 TABLET ORAL DAILY
Qty: 90 TABLET | Refills: 2 | Status: SHIPPED | OUTPATIENT
Start: 2024-03-12

## 2024-04-30 DIAGNOSIS — E78.00 PURE HYPERCHOLESTEROLEMIA: ICD-10-CM

## 2024-04-30 RX ORDER — PRAVASTATIN SODIUM 40 MG/1
40 TABLET ORAL NIGHTLY
Qty: 90 TABLET | Refills: 2 | Status: SHIPPED | OUTPATIENT
Start: 2024-04-30

## 2024-05-08 ENCOUNTER — LAB (OUTPATIENT)
Dept: LAB | Facility: LAB | Age: 74
End: 2024-05-08
Payer: MEDICARE

## 2024-05-08 ENCOUNTER — OFFICE VISIT (OUTPATIENT)
Dept: PRIMARY CARE | Facility: CLINIC | Age: 74
End: 2024-05-08
Payer: MEDICARE

## 2024-05-08 VITALS
WEIGHT: 199 LBS | BODY MASS INDEX: 27.86 KG/M2 | SYSTOLIC BLOOD PRESSURE: 130 MMHG | DIASTOLIC BLOOD PRESSURE: 80 MMHG | OXYGEN SATURATION: 98 % | TEMPERATURE: 97.5 F | HEIGHT: 71 IN | HEART RATE: 71 BPM

## 2024-05-08 DIAGNOSIS — R73.01 IMPAIRED FASTING GLUCOSE: ICD-10-CM

## 2024-05-08 DIAGNOSIS — J32.9 CHRONIC SINUSITIS, UNSPECIFIED LOCATION: ICD-10-CM

## 2024-05-08 DIAGNOSIS — R73.01 IMPAIRED FASTING BLOOD SUGAR: ICD-10-CM

## 2024-05-08 DIAGNOSIS — Z12.5 SCREENING FOR PROSTATE CANCER: ICD-10-CM

## 2024-05-08 DIAGNOSIS — K21.00 GASTRO-ESOPHAGEAL REFLUX DISEASE WITH ESOPHAGITIS, WITHOUT BLEEDING: ICD-10-CM

## 2024-05-08 DIAGNOSIS — I10 BENIGN HYPERTENSION: ICD-10-CM

## 2024-05-08 DIAGNOSIS — I10 ESSENTIAL HYPERTENSION: ICD-10-CM

## 2024-05-08 DIAGNOSIS — E78.2 MIXED HYPERLIPIDEMIA: ICD-10-CM

## 2024-05-08 DIAGNOSIS — Z00.00 ROUTINE GENERAL MEDICAL EXAMINATION AT HEALTH CARE FACILITY: Primary | ICD-10-CM

## 2024-05-08 LAB
ALBUMIN SERPL-MCNC: 4.3 G/DL (ref 3.5–5)
ALP BLD-CCNC: 76 U/L (ref 35–125)
ALT SERPL-CCNC: 19 U/L (ref 5–40)
ANION GAP SERPL CALC-SCNC: 13 MMOL/L
AST SERPL-CCNC: 18 U/L (ref 5–40)
BILIRUB SERPL-MCNC: 0.4 MG/DL (ref 0.1–1.2)
BUN SERPL-MCNC: 17 MG/DL (ref 8–25)
CALCIUM SERPL-MCNC: 9.6 MG/DL (ref 8.5–10.4)
CHLORIDE SERPL-SCNC: 100 MMOL/L (ref 97–107)
CO2 SERPL-SCNC: 28 MMOL/L (ref 24–31)
CREAT SERPL-MCNC: 1.1 MG/DL (ref 0.4–1.6)
EGFRCR SERPLBLD CKD-EPI 2021: 72 ML/MIN/1.73M*2
ERYTHROCYTE [DISTWIDTH] IN BLOOD BY AUTOMATED COUNT: 13.6 % (ref 11.5–14.5)
EST. AVERAGE GLUCOSE BLD GHB EST-MCNC: 114 MG/DL
GLUCOSE SERPL-MCNC: 113 MG/DL (ref 65–99)
HBA1C MFR BLD: 5.6 %
HCT VFR BLD AUTO: 46.8 % (ref 41–52)
HGB BLD-MCNC: 15.8 G/DL (ref 13.5–17.5)
MCH RBC QN AUTO: 29.2 PG (ref 26–34)
MCHC RBC AUTO-ENTMCNC: 33.8 G/DL (ref 32–36)
MCV RBC AUTO: 87 FL (ref 80–100)
NRBC BLD-RTO: 0 /100 WBCS (ref 0–0)
PLATELET # BLD AUTO: 348 X10*3/UL (ref 150–450)
POTASSIUM SERPL-SCNC: 3.7 MMOL/L (ref 3.4–5.1)
PROT SERPL-MCNC: 7 G/DL (ref 5.9–7.9)
RBC # BLD AUTO: 5.41 X10*6/UL (ref 4.5–5.9)
SODIUM SERPL-SCNC: 141 MMOL/L (ref 133–145)
WBC # BLD AUTO: 5.9 X10*3/UL (ref 4.4–11.3)

## 2024-05-08 PROCEDURE — 99214 OFFICE O/P EST MOD 30 MIN: CPT | Performed by: INTERNAL MEDICINE

## 2024-05-08 PROCEDURE — 1126F AMNT PAIN NOTED NONE PRSNT: CPT | Performed by: INTERNAL MEDICINE

## 2024-05-08 PROCEDURE — 85027 COMPLETE CBC AUTOMATED: CPT

## 2024-05-08 PROCEDURE — 3079F DIAST BP 80-89 MM HG: CPT | Performed by: INTERNAL MEDICINE

## 2024-05-08 PROCEDURE — 1036F TOBACCO NON-USER: CPT | Performed by: INTERNAL MEDICINE

## 2024-05-08 PROCEDURE — 80053 COMPREHEN METABOLIC PANEL: CPT

## 2024-05-08 PROCEDURE — G0439 PPPS, SUBSEQ VISIT: HCPCS | Performed by: INTERNAL MEDICINE

## 2024-05-08 PROCEDURE — 1123F ACP DISCUSS/DSCN MKR DOCD: CPT | Performed by: INTERNAL MEDICINE

## 2024-05-08 PROCEDURE — 3075F SYST BP GE 130 - 139MM HG: CPT | Performed by: INTERNAL MEDICINE

## 2024-05-08 PROCEDURE — 36415 COLL VENOUS BLD VENIPUNCTURE: CPT

## 2024-05-08 PROCEDURE — 1158F ADVNC CARE PLAN TLK DOCD: CPT | Performed by: INTERNAL MEDICINE

## 2024-05-08 PROCEDURE — 1159F MED LIST DOCD IN RCRD: CPT | Performed by: INTERNAL MEDICINE

## 2024-05-08 PROCEDURE — 83036 HEMOGLOBIN GLYCOSYLATED A1C: CPT

## 2024-05-08 PROCEDURE — 99215 OFFICE O/P EST HI 40 MIN: CPT | Performed by: INTERNAL MEDICINE

## 2024-05-08 RX ORDER — BISMUTH SUBSALICYLATE 262 MG
1 TABLET,CHEWABLE ORAL DAILY
COMMUNITY

## 2024-05-08 RX ORDER — TRIAMCINOLONE ACETONIDE 1 MG/G
OINTMENT TOPICAL 2 TIMES DAILY
COMMUNITY

## 2024-05-08 ASSESSMENT — PATIENT HEALTH QUESTIONNAIRE - PHQ9
SUM OF ALL RESPONSES TO PHQ9 QUESTIONS 1 AND 2: 0
SUM OF ALL RESPONSES TO PHQ9 QUESTIONS 1 AND 2: 0
2. FEELING DOWN, DEPRESSED OR HOPELESS: NOT AT ALL
1. LITTLE INTEREST OR PLEASURE IN DOING THINGS: NOT AT ALL
1. LITTLE INTEREST OR PLEASURE IN DOING THINGS: NOT AT ALL
2. FEELING DOWN, DEPRESSED OR HOPELESS: NOT AT ALL

## 2024-05-08 ASSESSMENT — ENCOUNTER SYMPTOMS
TREMORS: 0
PHOTOPHOBIA: 0
BRUISES/BLEEDS EASILY: 0
FREQUENCY: 0
FEVER: 0
HEADACHES: 0
PALPITATIONS: 0
COLOR CHANGE: 0
ABDOMINAL PAIN: 0
ARTHRALGIAS: 0
COUGH: 0
WEAKNESS: 0
SHORTNESS OF BREATH: 0
DYSURIA: 0
CHILLS: 0
DIARRHEA: 0

## 2024-05-08 ASSESSMENT — PAIN SCALES - GENERAL: PAINLEVEL: 0-NO PAIN

## 2024-05-08 NOTE — PROGRESS NOTES
[facility]: [facility]  MEDICARE WELLNESS EXAM      Lucius Busby is a 73 y.o. male that is presenting today for Annual Exam.    Assessment/Plan    Diagnoses and all orders for this visit:  Routine general medical examination at health care facility  Essential hypertension  Comments:  BP  doing OK, low salt diet, exercise, walking 30 minutes daily. Avoid alcohol, all can help lower BP.  Mixed hyperlipidemia  Comments:   12/23 on pravastatin.  Orders:  -     Comprehensive Metabolic Panel; Future  -     Lipid Panel; Future  Impaired fasting glucose  Comments:  HGA1C 5.7%. Low sugar deit, recheck electrolytes today.  Orders:  -     Hemoglobin A1C; Future  Screening for prostate cancer  Comments:  PSA came down 2.3 with free PSA 35%, better recheck  before  next visit.  Orders:  -     PSA; Future  Gastro-esophageal reflux disease with esophagitis, without bleeding  Comments:  Pantoprazole 40  in AM. Add Famotidine 20 mg 1-2 tablet at night, over the counter. URBAN's 200 doses $9.  Chronic sinusitis, unspecified location  Comments:  Flonase, Loratidine as needed.  Other orders  -     Follow Up In Primary Care - Medicare Annual    ADVANCED CARE PLANNING  Advanced Care Planning was discussed with patient:  The patient does not have an advanced care plan on file. The patient does not have an active surrogate decision-maker on file.  Encouraged the patient to confirm that Living Will and Healthcare Power of  (HCPoA) are accurate and up to date.  Encouraged the patient to confirm that our office be provided a copy of any documentation in the event that anything changes.    ACTIVITIES OF DAILY LIVING  Basic ADLs:  Bathing: Independent, Dressing: Independent, Toileting: Independent, Transferring: Independent, Continence: Independent, Feeding: Independent.    Instrumental ADLs:  Ability to use phone: Independent, Shopping: Independent, Cooking: Independent, House-keeping: Independent, Laundry:  Independent, Transportation: Independent, Medication Management: Independent, Finance Management: Independent.    Subjective   Wellness visit. Over all health status doing well. Diet reviewed, Mediterranean, low sugar diet suggested. No  active depression, or little interest in doing activites or hopelessness. Home safety reviewed, well light, no throw rugs,etc. No falls. Advanced directives filled out at home.  ADL, and instrumental ADL no limits doing well. Vision screen eye exam yearly, hearing screen 6 ft whisper test normal.  No cognitive decline observed. No opiod pain meds used. E/M HTN, low slat diet, alcohol, meds, diet reviewed. Chol, pravastatin. IFBS low sugar diet. PSA came back down was 4.0 now 2.3 in line with previous values. GERD, antireflux precautions, pantoprazole from omeprazole, lessen severity of sx in AM. Itching, mild steroid cream better after several hours. No hives, dry skin is biggest. Dr. Dominguez ENT, sees [facility] . Refills doing OK.    Review of Systems   Constitutional:  Negative for chills and fever.   HENT:  Negative for congestion.    Eyes:  Negative for photophobia and visual disturbance.   Respiratory:  Negative for cough and shortness of breath.    Cardiovascular:  Negative for chest pain and palpitations.   Gastrointestinal:  Negative for abdominal pain and diarrhea.   Endocrine: Negative for cold intolerance and heat intolerance.   Genitourinary:  Negative for dysuria and frequency.   Musculoskeletal:  Negative for arthralgias.   Skin:  Negative for color change.   Neurological:  Negative for tremors, weakness and headaches.   Hematological:  Does not bruise/bleed easily.     Objective   Vitals:    05/08/24 0900   BP: 130/80   Pulse: 71   Temp: 36.4 °C (97.5 °F)   SpO2: 98%      Body mass index is 28.15 kg/m².  Physical Exam  Constitutional:       General: He is not in acute distress.     Appearance: He is not toxic-appearing.   HENT:      Head:  Normocephalic and atraumatic.      Right Ear: Tympanic membrane and ear canal normal.      Left Ear: Tympanic membrane and ear canal normal.      Nose: Nose normal.      Mouth/Throat:      Pharynx: Oropharynx is clear.   Eyes:      Extraocular Movements: Extraocular movements intact.      Pupils: Pupils are equal, round, and reactive to light.   Cardiovascular:      Rate and Rhythm: Normal rate and regular rhythm.      Heart sounds: Normal heart sounds. No murmur heard.  Pulmonary:      Breath sounds: Normal breath sounds.   Abdominal:      General: Bowel sounds are normal. There is no distension.      Palpations: Abdomen is soft. There is no mass.      Tenderness: There is no abdominal tenderness.   Musculoskeletal:         General: No swelling or tenderness.      Right lower leg: No edema.      Left lower leg: No edema.   Skin:     General: Skin is warm and dry.   Neurological:      General: No focal deficit present.      Mental Status: He is oriented to person, place, and time.      Sensory: No sensory deficit.      Motor: No weakness.      Deep Tendon Reflexes: Reflexes normal.     Diagnostic Results   Lab Results   Component Value Date    GLUCOSE 106 (H) 12/13/2023    CALCIUM 9.8 12/13/2023     12/13/2023    K 3.8 12/13/2023    CO2 29 12/13/2023    CL 98 12/13/2023    BUN 17 12/13/2023    CREATININE 1.10 12/13/2023     Lab Results   Component Value Date    ALT 14 12/13/2023    AST 16 12/13/2023    ALKPHOS 85 12/13/2023    BILITOT 0.5 12/13/2023     Lab Results   Component Value Date    WBC 8.7 12/13/2023    HGB 16.2 12/13/2023    HCT 48.1 12/13/2023    MCV 88 12/13/2023     12/13/2023     Lab Results   Component Value Date    CHOL 167 12/13/2023    CHOL 144 04/18/2023    CHOL 177 10/04/2022     Lab Results   Component Value Date    HDL 39.0 (L) 12/13/2023    HDL 35 (L) 04/18/2023    HDL 37 (L) 10/04/2022     Lab Results   Component Value Date    LDLCALC 100 12/13/2023    LDLCALC 84 04/18/2023     "LDLCALC 118 10/04/2022     Lab Results   Component Value Date    TRIG 140 12/13/2023    TRIG 123 04/18/2023    TRIG 112 10/04/2022     No components found for: \"CHOLHDL\"  Lab Results   Component Value Date    HGBA1C 5.7 (H) 12/13/2023     Other labs not included in the list above reviewed either before or during this encounter.    History   Past Medical History:   Diagnosis Date   • Erectile dysfunction 08/28/2023    implant    • Essential hypertension 08/28/2023 4/23 ECG NSR   • Gastro-esophageal reflux disease with esophagitis, without bleeding 08/28/2023    EGD 10/22  PPI.   • Impaired fasting glucose 08/28/2023   • Memory impairment 07/10/2018    mild; frequent difficulty finding words; nl TSH/B12/fol/RPR 1/3/19   • Personal history of other diseases of the circulatory system     History of hypertension   • Peyronie disease 08/28/2023   • Pure hypercholesterolemia 08/28/2023   • Screening for prostate cancer     10/22 PSA 2.8 [facility]  12/23 [facility], 12/23 PSA 4.0 recheck 2/24 PSA/free PSA  2.3   • Screening for prostate cancer 02/22/2024 2/24 PSA 2.3     History reviewed. No pertinent surgical history.  Family History   Problem Relation Name Age of Onset   • Heart disease Mother     • Heart attack Father     • No Known Problems Daughter     • No Known Problems Sibling          1 brother 1 sister     Social History     Socioeconomic History   • Marital status:      Spouse name: Not on file   • Number of children: Not on file   • Years of education: Not on file   • Highest education level: Not on file   Occupational History   • Not on file   Tobacco Use   • Smoking status: Never     Passive exposure: Past   • Smokeless tobacco: Never   Vaping Use   • Vaping status: Never Used   Substance and Sexual Activity   • Alcohol use: Yes   • Drug use: Never   • Sexual activity: Not on file   Other Topics Concern   • Not on file   Social History Narrative   • Not on file "     Social Determinants of Health     Financial Resource Strain: Not on file   Food Insecurity: Not on file   Transportation Needs: Not on file   Physical Activity: Not on file   Stress: Not on file   Social Connections: Not on file   Intimate Partner Violence: Not on file   Housing Stability: Not on file     Allergies   Allergen Reactions   • Pollen Extracts Other   • Grass Pollen Other     Current Outpatient Medications on File Prior to Visit   Medication Sig Dispense Refill   • amLODIPine (Norvasc) 10 mg tablet Take 1 tablet (10 mg) by mouth once daily. 90 tablet 2   • azelastine (Astelin) 137 mcg (0.1 %) nasal spray Administer 1 spray into each nostril 2 times a day.     • fluticasone (Flonase) 50 mcg/actuation nasal spray Administer 1 spray into each nostril once daily.     • loratadine (Claritin) 10 mg tablet Take 1 tablet (10 mg) by mouth once daily.     • losartan-hydrochlorothiazide (Hyzaar) 100-25 mg tablet TAKE 1 TABLET BY MOUTH EVERY DAY 90 tablet 2   • multivitamin tablet Take 1 tablet by mouth once daily.     • pantoprazole (ProtoNix) 40 mg EC tablet Take 1 tablet (40 mg) by mouth once daily. Do not crush, chew, or split. 30 tablet 11   • pravastatin (Pravachol) 40 mg tablet TAKE 1 TABLET BY MOUTH AT NIGHT 90 tablet 2   • psyllium husk (METAMUCIL ORAL) Take 1 tablet by mouth.     • triamcinolone (Kenalog) 0.1 % ointment Apply topically 2 times a day.     • [DISCONTINUED] diphenhydrAMINE (Sominex) 25 mg tablet Take 1 tablet (25 mg) by mouth as needed at bedtime for sleep.       No current facility-administered medications on file prior to visit.     Immunization History   Administered Date(s) Administered   • Flu vaccine (IIV4), preservative free *Check age/dose* 11/20/2020   • Flu vaccine, quadrivalent, high-dose, preservative free, age 65y+ (FLUZONE) 10/06/2021, 10/06/2022   • Influenza, Seasonal, Quadrivalent, Adjuvanted 09/02/2023   • Influenza, Unspecified 09/27/2018   • Influenza, injectable,  quadrivalent 12/08/2017, 12/01/2019, 11/17/2020   • Influenza, seasonal, injectable 11/17/2010   • Influenza, seasonal, injectable, preservative free 11/20/2013, 10/24/2014   • Novel influenza-H1N1-09, preservative-free 12/17/2009   • Pfizer COVID-19 vaccine, bivalent, age 12 years and older (30 mcg/0.3 mL) 01/11/2023   • Pfizer Purple Cap SARS-CoV-2 03/02/2021, 03/30/2021, 10/11/2021   • Pneumococcal conjugate vaccine, 13-valent (PREVNAR 13) 01/04/2018   • Pneumococcal polysaccharide vaccine, 23-valent, age 2 years and older (PNEUMOVAX 23) 10/01/2018, 01/10/2019   • RESPIRATORY SYNCYTIAL VIRUS (RSV), ELIGIBLE PREGNANT PTS, 0.5 ML (ABRYSVO) 09/02/2023   • Tdap vaccine, age 7 year and older (BOOSTRIX, ADACEL) 12/01/2014   • Zoster, live 12/04/2014, 01/01/2015     Patient's medical history was reviewed and updated either before or during this encounter.     Kehinde Henderson MD

## 2024-05-08 NOTE — PATIENT INSTRUCTIONS
follow up end November, labs 1-2 weeks before.      Diagnoses and all orders for this visit:  Routine general medical examination at health care facility  Comments:  Bring in Living Will,[employer] papers on next visit to scan in chart.  Essential hypertension  Comments:  BP  doing OK, low salt diet, exercise, walking 30 minutes daily. Avoid alcohol, all can help lower BP.  Mixed hyperlipidemia  Comments:   12/23 on pravastatin.  Orders:  -     Comprehensive Metabolic Panel; Future  -     Lipid Panel; Future  Impaired fasting glucose  Comments:  HGA1C 5.7%. Low sugar deit, recheck electrolytes today.  Orders:  -     Hemoglobin A1C; Future  Screening for prostate cancer  Comments:  PSA came down 2.3 with free PSA 35%, better recheck  before  next visit.  Orders:  -     PSA; Future  Gastro-esophageal reflux disease with esophagitis, without bleeding  Comments:  Pantoprazole 40  in AM. Add Famotidine 20 mg 1-2 tablet at night, over the counter. URBAN's 200 doses $9.  Chronic sinusitis, unspecified location  Comments:  Flonase, Loratidine as needed.  Other orders  -     Follow Up In Primary Care - Medicare Annual

## 2024-08-03 DIAGNOSIS — Z88.9 MULTIPLE ALLERGIES: Primary | ICD-10-CM

## 2024-08-09 RX ORDER — FLUTICASONE PROPIONATE 50 MCG
SPRAY, SUSPENSION (ML) NASAL
Qty: 48 G | Refills: 0 | Status: SHIPPED | OUTPATIENT
Start: 2024-08-09

## 2024-08-09 RX ORDER — AZELASTINE 1 MG/ML
SPRAY, METERED NASAL
Qty: 30 ML | Refills: 0 | Status: SHIPPED | OUTPATIENT
Start: 2024-08-09

## 2024-09-04 ENCOUNTER — TELEPHONE (OUTPATIENT)
Dept: PRIMARY CARE | Facility: CLINIC | Age: 74
End: 2024-09-04
Payer: MEDICARE

## 2024-09-04 NOTE — TELEPHONE ENCOUNTER
Spouse states pt attending wedding on 8/31, symptoms began 9/1. Wheezing, chest congestion, cough, sinus congestion, sore throat at the beginning. Pt to take covid test and call with results.

## 2024-09-09 DIAGNOSIS — I10 BENIGN HYPERTENSION: ICD-10-CM

## 2024-09-14 RX ORDER — AMLODIPINE BESYLATE 10 MG/1
10 TABLET ORAL DAILY
Qty: 90 TABLET | Refills: 0 | Status: SHIPPED | OUTPATIENT
Start: 2024-09-14

## 2024-09-30 ENCOUNTER — TELEPHONE (OUTPATIENT)
Dept: PRIMARY CARE | Facility: CLINIC | Age: 74
End: 2024-09-30
Payer: MEDICARE

## 2024-09-30 DIAGNOSIS — Z88.9 MULTIPLE ALLERGIES: ICD-10-CM

## 2024-09-30 RX ORDER — AZELASTINE 1 MG/ML
1 SPRAY, METERED NASAL 2 TIMES DAILY
Qty: 30 ML | Refills: 0 | Status: SHIPPED | OUTPATIENT
Start: 2024-09-30

## 2024-09-30 RX ORDER — FLUTICASONE PROPIONATE 50 MCG
1 SPRAY, SUSPENSION (ML) NASAL DAILY
Qty: 48 G | Refills: 0 | Status: SHIPPED | OUTPATIENT
Start: 2024-09-30 | End: 2024-10-01 | Stop reason: SDUPTHER

## 2024-09-30 NOTE — TELEPHONE ENCOUNTER
CHRIS 5/8/24 ROMAN Henderson 11/20/24 Stephani    Azelastine 137 mcg  Fluticasone 50 mcg/actuation    [facility] [address], [place]

## 2024-10-01 ENCOUNTER — APPOINTMENT (OUTPATIENT)
Dept: OTOLARYNGOLOGY | Facility: CLINIC | Age: 74
End: 2024-10-01
Payer: MEDICARE

## 2024-10-01 DIAGNOSIS — Z88.9 MULTIPLE ALLERGIES: ICD-10-CM

## 2024-10-01 DIAGNOSIS — J30.9 ALLERGIC RHINITIS, UNSPECIFIED SEASONALITY, UNSPECIFIED TRIGGER: ICD-10-CM

## 2024-10-01 DIAGNOSIS — H61.23 BILATERAL IMPACTED CERUMEN: ICD-10-CM

## 2024-10-01 DIAGNOSIS — R09.A2 GLOBUS SENSATION: Primary | ICD-10-CM

## 2024-10-01 DIAGNOSIS — K21.00 GASTROESOPHAGEAL REFLUX DISEASE WITH ESOPHAGITIS WITHOUT HEMORRHAGE: ICD-10-CM

## 2024-10-01 DIAGNOSIS — K21.9 GERD WITHOUT ESOPHAGITIS: ICD-10-CM

## 2024-10-01 PROCEDURE — 1159F MED LIST DOCD IN RCRD: CPT | Performed by: OTOLARYNGOLOGY

## 2024-10-01 PROCEDURE — 99214 OFFICE O/P EST MOD 30 MIN: CPT | Performed by: OTOLARYNGOLOGY

## 2024-10-01 PROCEDURE — 1036F TOBACCO NON-USER: CPT | Performed by: OTOLARYNGOLOGY

## 2024-10-01 RX ORDER — FLUTICASONE PROPIONATE 50 MCG
1 SPRAY, SUSPENSION (ML) NASAL DAILY
Qty: 48 G | Refills: 3 | Status: SHIPPED | OUTPATIENT
Start: 2024-10-01

## 2024-10-01 RX ORDER — PANTOPRAZOLE SODIUM 40 MG/1
40 TABLET, DELAYED RELEASE ORAL DAILY
Qty: 30 TABLET | Refills: 11 | Status: SHIPPED | OUTPATIENT
Start: 2024-10-01 | End: 2025-10-01

## 2024-10-01 NOTE — PROGRESS NOTES
Chief Complaint   Patient presents with    Earache     LOV: 8/2021 PREVIOUS LEFT EAR ACHE, SINUS ISSUES      Date of Evaluation: 10/1/2024   HPI  Lucius Busby is a 74 y.o. male with a history of recurrent cerumen impactions.  Both ears are feeling plugged.  He has a second issue of chronic postnasal drip and cough.  He has been treated for reflux and noticed significant improvement when switched from omeprazole to pantoprazole in the morning and Pepcid at night.  He still has some of the issues.  He has been on Flonase but discontinued it.  He has some nasal congestion.  Both ears feel plugged       Past Medical History:   Diagnosis Date    Erectile dysfunction 08/28/2023    implant     Essential hypertension 08/28/2023 4/23 ECG NSR    Gastro-esophageal reflux disease with esophagitis, without bleeding 08/28/2023    EGD 10/22  PPI.    Impaired fasting glucose 08/28/2023    Memory impairment 07/10/2018    mild; frequent difficulty finding words; nl TSH/B12/fol/RPR 1/3/19    Personal history of other diseases of the circulatory system     History of hypertension    Peyronie disease 08/28/2023    Pure hypercholesterolemia 08/28/2023    Screening for prostate cancer     [facility]  [facility] ED, 12/23 PSA 4.0 recheck 2/24 PSA/free PSA  2.3    Screening for prostate cancer 02/22/2024 2/24 PSA 2.3      History reviewed. No pertinent surgical history.       Medications:   Current Outpatient Medications   Medication Instructions    amLODIPine (NORVASC) 10 mg, oral, Daily    azelastine (Astelin) 137 mcg (0.1 %) nasal spray 1 spray, Each Nostril, 2 times daily, Use in each nostril as directed    fluticasone (Flonase) 50 mcg/actuation nasal spray 1 spray, Each Nostril, Daily, Shake gently. Before first use, prime pump. After use, clean tip and replace cap.    loratadine (Claritin) 10 mg tablet 1 tablet, oral, Daily    losartan-hydrochlorothiazide (Hyzaar) 100-25 mg tablet 1  tablet, oral, Daily    multivitamin tablet 1 tablet, oral, Daily    pantoprazole (PROTONIX) 40 mg, oral, Daily, Do not crush, chew, or split.    pravastatin (PRAVACHOL) 40 mg, oral, Nightly    psyllium husk (METAMUCIL ORAL) 1 tablet, oral    triamcinolone (Kenalog) 0.1 % ointment Topical, 2 times daily        Allergies:  Allergies   Allergen Reactions    Pollen Extracts Other    Grass Pollen Other        Physical Exam:  Last Recorded Vitals  There were no vitals taken for this visit.  []General appearance: Well-developed, well-nourished in no acute distress, conversant with normal voice quality    Head/face: No erythema or edema or facial tenderness, and normal facial nerve function bilaterally    External ear: Clear external auditory canals with normal pinnae bilateral large cerumen impactions obstructing the entire ear canal.  Removed with curettes under microscope  Tube status: N/A  Middle ear: Tympanic membranes intact and mobile, middle ears normal.  Tympanic membrane perforation: N/A  Mastoid bowl: N/A  Hearing: Normal conversational awareness at normal speech thresholds    Nose visualized using: Anterior rhinoscopy  Nasal dorsum: Nontraumatic midline appearance  Septum: Midline, nonobstructing  Inferior turbinates: Normal, pink  Secretions: Dry    Oral cavity and oropharynx: Normal  Teeth: Good condition  Floor of mouth: without lesions  Palate: Normal hard palate, soft palate and uvula  Oropharynx: Clear, no lesions present  Buccal mucosa: Normal without masses or lesions  Lips: Normal    Nasopharynx: Inadequate mirror exam secondary to gag/anatomy    Neck:  Salivary glands: Normal bilateral parotid and submandibular glands by inspection and palpation.  Non-thyroid masses: No palpable masses or significant lymphadenopathy  Trachea: Midline  Thyroid: No thyromegaly or palpable nodules  Temporomandibular joint: Nontender  Cervical range of motion: Normal    Neurologic exam: Alert and oriented x3, appropriate  affect.  Cranial nerves II-XII normal bilaterally  Extraocular movement: Extraocular movement intact, normal gaze alignment        Lucius was seen today for earache.  Diagnoses and all orders for this visit:  Globus sensation (Primary)  Bilateral impacted cerumen  GERD without esophagitis  Allergic rhinitis, unspecified seasonality, unspecified trigger  Gastroesophageal reflux disease with esophagitis without hemorrhage  Comments:  Stop omeprazole and switch to pantoprazole 40 mg daily. Antireflux precautions.  Orders:  -     pantoprazole (ProtoNix) 40 mg EC tablet; Take 1 tablet (40 mg) by mouth once daily. Do not crush, chew, or split.  Multiple allergies  -     fluticasone (Flonase) 50 mcg/actuation nasal spray; Administer 1 spray into each nostril once daily. Shake gently. Before first use, prime pump. After use, clean tip and replace cap.       PLAN  His ears were cleaned with immediate improvement in the plugging sensation.  We have had a long discussion regarding his postnasal drip and globus sensation.  I would like him to use pantoprazole on a daily basis in the morning and Gaviscon before bed.  He will also use fluticasone on a daily basis.  If this fails to improve his situation we may consider allergy testing and a CT scan of the paranasal sinuses.  Recheck in 1 year for cerumen removal    Chuy Dominguez MD

## 2024-10-02 DIAGNOSIS — I10 BENIGN HYPERTENSION: ICD-10-CM

## 2024-10-02 RX ORDER — AMLODIPINE BESYLATE 10 MG/1
10 TABLET ORAL DAILY
Qty: 90 TABLET | Refills: 3 | Status: SHIPPED | OUTPATIENT
Start: 2024-10-02

## 2024-10-24 DIAGNOSIS — I10 BENIGN HYPERTENSION: ICD-10-CM

## 2024-10-24 NOTE — TELEPHONE ENCOUNTER
[facility] patient [facility] 5/8/24  [place]  11/20/24 Dr Styles, called pharmacy and spoke with Alanis who states that they did not get the refill for this med on 10/2/24.

## 2024-10-25 RX ORDER — AMLODIPINE BESYLATE 10 MG/1
10 TABLET ORAL DAILY
Qty: 90 TABLET | Refills: 3 | Status: SHIPPED | OUTPATIENT
Start: 2024-10-25

## 2024-11-20 DIAGNOSIS — I10 BENIGN HYPERTENSION: ICD-10-CM

## 2024-11-20 DIAGNOSIS — Z88.9 MULTIPLE ALLERGIES: ICD-10-CM

## 2024-11-20 RX ORDER — FLUTICASONE PROPIONATE 50 MCG
1 SPRAY, SUSPENSION (ML) NASAL DAILY
Qty: 48 G | Refills: 0 | Status: SHIPPED | OUTPATIENT
Start: 2024-11-20

## 2024-11-20 RX ORDER — AMLODIPINE BESYLATE 10 MG/1
10 TABLET ORAL DAILY
Qty: 30 TABLET | Refills: 0 | Status: SHIPPED | OUTPATIENT
Start: 2024-11-20

## 2024-11-20 RX ORDER — AZELASTINE 1 MG/ML
1 SPRAY, METERED NASAL 2 TIMES DAILY
Qty: 30 ML | Refills: 0 | Status: SHIPPED | OUTPATIENT
Start: 2024-11-20

## 2024-12-04 PROBLEM — J31.0 CHRONIC RHINITIS: Status: ACTIVE | Noted: 2018-07-10

## 2024-12-05 PROBLEM — H61.23 BILATERAL IMPACTED CERUMEN: Status: RESOLVED | Noted: 2023-08-28 | Resolved: 2024-12-05

## 2024-12-05 PROBLEM — G89.18 POSTOPERATIVE PAIN: Status: RESOLVED | Noted: 2024-02-20 | Resolved: 2024-12-05

## 2024-12-05 PROBLEM — H61.20 IMPACTED CERUMEN: Status: RESOLVED | Noted: 2024-02-20 | Resolved: 2024-12-05

## 2024-12-06 ENCOUNTER — OFFICE VISIT (OUTPATIENT)
Dept: PRIMARY CARE | Facility: CLINIC | Age: 74
End: 2024-12-06
Payer: MEDICARE

## 2024-12-06 VITALS
OXYGEN SATURATION: 96 % | SYSTOLIC BLOOD PRESSURE: 132 MMHG | BODY MASS INDEX: 28 KG/M2 | TEMPERATURE: 97.6 F | WEIGHT: 200 LBS | DIASTOLIC BLOOD PRESSURE: 70 MMHG | HEART RATE: 74 BPM | HEIGHT: 71 IN

## 2024-12-06 DIAGNOSIS — I10 ESSENTIAL HYPERTENSION: ICD-10-CM

## 2024-12-06 DIAGNOSIS — J31.0 CHRONIC RHINITIS: ICD-10-CM

## 2024-12-06 DIAGNOSIS — R09.82 POST-NASAL DRIP: ICD-10-CM

## 2024-12-06 DIAGNOSIS — E78.00 PURE HYPERCHOLESTEROLEMIA: ICD-10-CM

## 2024-12-06 DIAGNOSIS — Z76.89 ENCOUNTER TO ESTABLISH CARE: Primary | ICD-10-CM

## 2024-12-06 DIAGNOSIS — K21.00 GASTROESOPHAGEAL REFLUX DISEASE WITH ESOPHAGITIS WITHOUT HEMORRHAGE: ICD-10-CM

## 2024-12-06 DIAGNOSIS — Z88.9 MULTIPLE ALLERGIES: ICD-10-CM

## 2024-12-06 PROBLEM — Z86.79 HISTORY OF HYPERTENSION: Status: RESOLVED | Noted: 2024-02-20 | Resolved: 2024-12-06

## 2024-12-06 PROCEDURE — 1159F MED LIST DOCD IN RCRD: CPT | Performed by: NURSE PRACTITIONER

## 2024-12-06 PROCEDURE — 1036F TOBACCO NON-USER: CPT | Performed by: NURSE PRACTITIONER

## 2024-12-06 PROCEDURE — 3075F SYST BP GE 130 - 139MM HG: CPT | Performed by: NURSE PRACTITIONER

## 2024-12-06 PROCEDURE — 3078F DIAST BP <80 MM HG: CPT | Performed by: NURSE PRACTITIONER

## 2024-12-06 PROCEDURE — 1126F AMNT PAIN NOTED NONE PRSNT: CPT | Performed by: NURSE PRACTITIONER

## 2024-12-06 PROCEDURE — 99214 OFFICE O/P EST MOD 30 MIN: CPT | Performed by: NURSE PRACTITIONER

## 2024-12-06 PROCEDURE — 3008F BODY MASS INDEX DOCD: CPT | Performed by: NURSE PRACTITIONER

## 2024-12-06 RX ORDER — MAG/ALUMINUM/SOD BICARB/ALGINC 14.2-80MG
TABLET,CHEWABLE ORAL
COMMUNITY

## 2024-12-06 RX ORDER — AZELASTINE 1 MG/ML
SPRAY, METERED NASAL
Qty: 30 ML | Refills: 0 | Status: SHIPPED | OUTPATIENT
Start: 2024-12-06

## 2024-12-06 RX ORDER — LOSARTAN POTASSIUM AND HYDROCHLOROTHIAZIDE 25; 100 MG/1; MG/1
1 TABLET ORAL DAILY
Qty: 90 TABLET | Refills: 3 | Status: SHIPPED | OUTPATIENT
Start: 2024-12-06

## 2024-12-06 RX ORDER — FAMOTIDINE 20 MG/1
TABLET, FILM COATED ORAL
COMMUNITY

## 2024-12-06 ASSESSMENT — ENCOUNTER SYMPTOMS
CONSTITUTIONAL NEGATIVE: 1
OCCASIONAL FEELINGS OF UNSTEADINESS: 0
DEPRESSION: 0
RESPIRATORY NEGATIVE: 1
SORE THROAT: 1
GASTROINTESTINAL NEGATIVE: 1
PSYCHIATRIC NEGATIVE: 1
CARDIOVASCULAR NEGATIVE: 1
LOSS OF SENSATION IN FEET: 0

## 2024-12-06 ASSESSMENT — PATIENT HEALTH QUESTIONNAIRE - PHQ9
1. LITTLE INTEREST OR PLEASURE IN DOING THINGS: NOT AT ALL
SUM OF ALL RESPONSES TO PHQ9 QUESTIONS 1 AND 2: 0
2. FEELING DOWN, DEPRESSED OR HOPELESS: NOT AT ALL

## 2024-12-06 ASSESSMENT — PAIN SCALES - GENERAL: PAINLEVEL_OUTOF10: 0-NO PAIN

## 2024-12-06 NOTE — ASSESSMENT & PLAN NOTE
Managed with losartan-hydrochlorothiazide 100-25mg daily  Managed with amlodipine 10mg daily  Last labs May 2024

## 2024-12-06 NOTE — PROGRESS NOTES
[facility]: [facility]  PROGRESS NOTE      Lucius Busby is a 74 y.o. male that is presenting today for 6 month follow up and to establish care.  He was last seen in May for his Annual medicare wellness exam.  He is a former patient of Dr. Henderson last seen 5/8/24.  He states he is doing well except he has some post nasal drip at night that causes him to cough and a slight sore throat.  No sinus pain or pressure, no ear pain, no fever, chills, n/v/d.  No cough, wheezing or shortness of breath.  Does see ENT Dr. Dominguez for chronic sinus issues.  Has not taken anything for the post nasal drip.  Assessment/Plan   Assessment & Plan  Encounter to establish care  will get flu and covid vaccine at the grocery store  Will get [facility]Dap at [facility] in May  Labs ordered prior to [facility] in May 2025  Essential hypertension  Managed with losartan-hydrochlorothiazide 100-25mg daily  Managed with amlodipine 10mg daily  Last labs May 2024    Pure hypercholesterolemia  Managed with pravastatin 40mg daily  Last lipid panel in May 2024    Gastroesophageal reflux disease with esophagitis without hemorrhage  Stable managed with pantoprozole 40mg daily    Chronic rhinitis  Follow Dr. Dominguez ENT    Post-nasal drip  Mucinex twice a day as needed  If no improvement of if symptoms worsen he was advised to call the office         Subjective   HPI  Review of Systems   Constitutional: Negative.    HENT:  Positive for postnasal drip and sore throat.    Respiratory: Negative.     Cardiovascular: Negative.    Gastrointestinal: Negative.    Skin: Negative.    Psychiatric/Behavioral: Negative.        Objective   There were no vitals filed for this visit.   There is no height or weight on file to calculate BMI.  Physical Exam  Vitals reviewed.   Constitutional:       Appearance: Normal appearance.   HENT:      Head: Normocephalic and atraumatic.      Right Ear: Tympanic membrane normal.      Left Ear: Tympanic membrane normal.      Nose: Nose  "normal.      Mouth/Throat:      Mouth: Mucous membranes are moist.      Pharynx: Oropharynx is clear.   Cardiovascular:      Rate and Rhythm: Normal rate and regular rhythm.      Pulses: Normal pulses.      Heart sounds: Normal heart sounds.   Pulmonary:      Effort: Pulmonary effort is normal.      Breath sounds: Normal breath sounds.   Abdominal:      General: Bowel sounds are normal.      Palpations: Abdomen is soft.   Skin:     General: Skin is warm and dry.   Neurological:      General: No focal deficit present.      Mental Status: He is alert and oriented to person, place, and time.   Psychiatric:         Mood and Affect: Mood normal.         Behavior: Behavior normal.         Thought Content: Thought content normal.         Judgment: Judgment normal.       Vitals:    12/06/24 1009   BP: 132/70   Pulse: 74   Temp: 36.4 °C (97.6 °F)   SpO2: 96%       Diagnostic Results   Lab Results   Component Value Date    GLUCOSE 113 (H) 05/08/2024    CALCIUM 9.6 05/08/2024     05/08/2024    K 3.7 05/08/2024    CO2 28 05/08/2024     05/08/2024    BUN 17 05/08/2024    CREATININE 1.10 05/08/2024     Lab Results   Component Value Date    ALT 19 05/08/2024    AST 18 05/08/2024    ALKPHOS 76 05/08/2024    BILITOT 0.4 05/08/2024     Lab Results   Component Value Date    WBC 5.9 05/08/2024    HGB 15.8 05/08/2024    HCT 46.8 05/08/2024    MCV 87 05/08/2024     05/08/2024     Lab Results   Component Value Date    CHOL 167 12/13/2023    CHOL 144 04/18/2023    CHOL 177 10/04/2022     Lab Results   Component Value Date    HDL 39.0 (L) 12/13/2023    HDL 35 (L) 04/18/2023    HDL 37 (L) 10/04/2022     Lab Results   Component Value Date    LDLCALC 100 12/13/2023    LDLCALC 84 04/18/2023    LDLCALC 118 10/04/2022     Lab Results   Component Value Date    TRIG 140 12/13/2023    TRIG 123 04/18/2023    TRIG 112 10/04/2022     No components found for: \"CHOLHDL\"  Lab Results   Component Value Date    HGBA1C 5.6 05/08/2024 "     Other labs not included in the list above were reviewed either before or during this encounter.    History    Past Medical History:   Diagnosis Date    Erectile dysfunction 08/28/2023    implant     Essential hypertension 08/28/2023 4/23 ECG NSR    Gastro-esophageal reflux disease with esophagitis, without bleeding 08/28/2023    EGD 10/22  PPI.    Impaired fasting glucose 08/28/2023    Memory impairment 07/10/2018    mild; frequent difficulty finding words; nl TSH/B12/fol/RPR 1/3/19    Personal history of other diseases of the circulatory system     History of hypertension    Peyronie disease 08/28/2023    Pure hypercholesterolemia 08/28/2023    Screening for prostate cancer     10/22 PSA 2.8 [facility]  12/23 [facility] ED, 12/23 PSA 4.0 recheck 2/24 PSA/free PSA  2.3    Screening for prostate cancer 02/22/2024 2/24 PSA 2.3     History reviewed. No pertinent surgical history.  Family History   Problem Relation Name Age of Onset    Heart disease Mother      Heart attack Father      No Known Problems Daughter      No Known Problems Sibling          1 brother 1 sister     Social History     Socioeconomic History    Marital status:      Spouse name: Not on file    Number of children: Not on file    Years of education: Not on file    Highest education level: Not on file   Occupational History    Not on file   Tobacco Use    Smoking status: Never     Passive exposure: Past    Smokeless tobacco: Never   Vaping Use    Vaping status: Never Used   Substance and Sexual Activity    Alcohol use: Yes    Drug use: Never    Sexual activity: Not on file   Other Topics Concern    Not on file   Social History Narrative    Not on file     Social Drivers of Health     Financial Resource Strain: Not on file   Food Insecurity: Not on file   Transportation Needs: Not on file   Physical Activity: Not on file   Stress: Not on file   Social Connections: Not on file   Intimate Partner Violence: Not  on file   Housing Stability: Not on file     Allergies   Allergen Reactions    Pollen Extracts Other    Grass Pollen Other     Current Outpatient Medications on File Prior to Visit   Medication Sig Dispense Refill    Al hyd-Mg tr-alg ac-sod bicarb (Gaviscon) 80-14.2 mg tablet,chewable Chew.      amLODIPine (Norvasc) 10 mg tablet Take 1 tablet (10 mg) by mouth once daily. 30 tablet 0    azelastine (Astelin) 137 mcg (0.1 %) nasal spray Administer 1 spray into each nostril 2 times a day. Use in each nostril as directed (Patient taking differently: Administer 1 spray into each nostril 2 times a day. Use in each nostril as directed 1 spray at night) 30 mL 0    famotidine (Pepcid) 20 mg tablet Take by mouth.      fluticasone (Flonase) 50 mcg/actuation nasal spray Administer 1 spray into each nostril once daily. Shake gently. Before first use, prime pump. After use, clean tip and replace cap. (Patient taking differently: Administer 2 sprays into each nostril once daily. Shake gently. Before first use, prime pump. After use, clean tip and replace cap.) 48 g 0    loratadine (Claritin) 10 mg tablet Take 1 tablet (10 mg) by mouth once daily.      losartan-hydrochlorothiazide (Hyzaar) 100-25 mg tablet TAKE 1 TABLET BY MOUTH EVERY DAY 90 tablet 2    multivitamin tablet Take 1 tablet by mouth once daily.      pantoprazole (ProtoNix) 40 mg EC tablet Take 1 tablet (40 mg) by mouth once daily. Do not crush, chew, or split. 30 tablet 11    pravastatin (Pravachol) 40 mg tablet TAKE 1 TABLET BY MOUTH AT NIGHT 90 tablet 2    psyllium husk (METAMUCIL ORAL) Take 1 tablet by mouth.      triamcinolone (Kenalog) 0.1 % ointment Apply topically 2 times a day.       No current facility-administered medications on file prior to visit.     Immunization History   Administered Date(s) Administered    Flu vaccine (IIV4), preservative free *Check age/dose* 11/20/2020    Flu vaccine, quadrivalent, high-dose, preservative free, age 65y+ (FLUZONE)  10/06/2021, 10/06/2022    Flu vaccine, trivalent, preservative free, age 6 months and greater (Fluarix/Fluzone/Flulaval) 11/20/2013, 10/24/2014    Influenza, Seasonal, Quadrivalent, Adjuvanted 09/02/2023    Influenza, Unspecified 09/27/2018    Influenza, injectable, quadrivalent 12/08/2017, 12/01/2019, 11/17/2020    Influenza, seasonal, injectable 11/17/2010    Novel influenza-H1N1-09, preservative-free 12/17/2009    Pfizer COVID-19 vaccine, bivalent, age 12 years and older (30 mcg/0.3 mL) 01/11/2023    Pfizer Purple Cap SARS-CoV-2 03/02/2021, 03/30/2021, 10/11/2021    Pneumococcal conjugate vaccine, 13-valent (PREVNAR 13) 01/04/2018    Pneumococcal polysaccharide vaccine, 23-valent, age 2 years and older (PNEUMOVAX 23) 10/01/2018, 01/10/2019    RESPIRATORY SYNCYTIAL VIRUS (RSV), ELIGIBLE PREGNANT PTS, 0.5 ML (ABRYSVO) 09/02/2023    Tdap vaccine, age 7 year and older (BOOSTRIX, ADACEL) 12/01/2014    Zoster, live 12/04/2014, 01/01/2015     Patient's medical history was reviewed and updated either before or during this encounter.       Thalia Beltrán, APRN-CNP

## 2025-02-26 DIAGNOSIS — I10 BENIGN HYPERTENSION: ICD-10-CM

## 2025-02-26 RX ORDER — AMLODIPINE BESYLATE 10 MG/1
10 TABLET ORAL DAILY
Qty: 30 TABLET | Refills: 1 | Status: SHIPPED | OUTPATIENT
Start: 2025-02-26

## 2025-05-05 DIAGNOSIS — E78.00 PURE HYPERCHOLESTEROLEMIA: ICD-10-CM

## 2025-05-05 RX ORDER — PRAVASTATIN SODIUM 40 MG/1
40 TABLET ORAL NIGHTLY
Qty: 90 TABLET | Refills: 3 | Status: SHIPPED | OUTPATIENT
Start: 2025-05-05

## 2025-05-08 PROBLEM — K21.9 GASTROESOPHAGEAL REFLUX DISEASE: Status: RESOLVED | Noted: 2018-07-10 | Resolved: 2025-05-08

## 2025-05-08 LAB
ALBUMIN SERPL-MCNC: 4.2 G/DL (ref 3.6–5.1)
ALP SERPL-CCNC: 66 U/L (ref 35–144)
ALT SERPL-CCNC: 13 U/L (ref 9–46)
ANION GAP SERPL CALCULATED.4IONS-SCNC: 8 MMOL/L (CALC) (ref 7–17)
AST SERPL-CCNC: 14 U/L (ref 10–35)
BILIRUB SERPL-MCNC: 0.7 MG/DL (ref 0.2–1.2)
BUN SERPL-MCNC: 16 MG/DL (ref 7–25)
CALCIUM SERPL-MCNC: 9.3 MG/DL (ref 8.6–10.3)
CHLORIDE SERPL-SCNC: 101 MMOL/L (ref 98–110)
CHOLEST SERPL-MCNC: 139 MG/DL
CHOLEST/HDLC SERPL: 3.8 (CALC)
CO2 SERPL-SCNC: 32 MMOL/L (ref 20–32)
CREAT SERPL-MCNC: 1 MG/DL (ref 0.7–1.28)
EGFRCR SERPLBLD CKD-EPI 2021: 80 ML/MIN/1.73M2
EST. AVERAGE GLUCOSE BLD GHB EST-MCNC: 120 MG/DL
EST. AVERAGE GLUCOSE BLD GHB EST-SCNC: 6.6 MMOL/L
GLUCOSE SERPL-MCNC: 110 MG/DL (ref 65–99)
HBA1C MFR BLD: 5.8 %
HDLC SERPL-MCNC: 37 MG/DL
LDLC SERPL CALC-MCNC: 79 MG/DL (CALC)
NONHDLC SERPL-MCNC: 102 MG/DL (CALC)
POTASSIUM SERPL-SCNC: 3.7 MMOL/L (ref 3.5–5.3)
PROT SERPL-MCNC: 6.8 G/DL (ref 6.1–8.1)
PSA SERPL-MCNC: 2.78 NG/ML
SODIUM SERPL-SCNC: 141 MMOL/L (ref 135–146)
TRIGL SERPL-MCNC: 130 MG/DL

## 2025-05-08 NOTE — ASSESSMENT & PLAN NOTE
Normal cmp  Continue amlodipine 10mg daily  Continue losartan-hydrochlorothiazide 100-25mg daily

## 2025-05-08 NOTE — ASSESSMENT & PLAN NOTE
Managed with protonix 40mg daily  No flares  Has changed his eating habits does not eat after dinner and does not eat at night

## 2025-05-08 NOTE — PROGRESS NOTES
[facility]: [facility]  MEDICARE WELLNESS EXAM      Lucius Busby is a 74 y.o. male that is presenting today for annual medicare wellness exam and management of medical conditions.  States he is doing well.  Has been having some issues with left middle finger locking up when he wakes up in the morning.  Rubs his finger and moves his hand and the locking subsides.  No pain or discomfort in that finger or hand, no decreases rom, numbness or tingling.  Has full use of his hand    Assessment/Plan    Assessment & Plan  Encounter for Medicare annual wellness exam  Medications and problem list reconciled today    Last colonoscopy 4-5 years ago due again in 2026  Current with eye exam       Essential hypertension  Normal cmp  Continue amlodipine 10mg daily  Continue losartan-hydrochlorothiazide 100-25mg daily       Mixed hyperlipidemia  Lipid panel is stable  Continue pravachol 40mg daily       Impaired fasting glucose  A1c 5.8        Gastro-esophageal reflux disease with esophagitis, without bleeding  Managed with protonix 40mg daily  No flares  Has changed his eating habits does not eat after dinner and does not eat at night       Chronic rhinitis  Controlled with nasal sparys       Chronic urticaria  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 The patient has an active surrogate decision-maker on file.      ACTIVITIES OF DAILY LIVING  Basic  ADLs:  Bathing: Independent, Dressing: Independent, Toileting: Independent, Transferring: Independent, Continence: Independent, Feeding: Independent.    Instrumental ADLs:  Ability to use phone: Independent, Shopping: Independent, Cooking: Independent, House-keeping: Independent, Laundry: Independent, Transportation: Independent, Medication Management: Independent, Finance Management: Independent.    Subjective   HPI  Review of Systems   Constitutional: Negative.    HENT: Negative.     Eyes: Negative.    Respiratory: Negative.     Cardiovascular: Negative.    Gastrointestinal: Negative.    Endocrine: Negative.    Genitourinary: Negative.    Musculoskeletal:         Locking up of left middle finger   Skin: Negative.    Allergic/Immunologic: Negative.    Neurological: Negative.    Hematological: Negative.    Psychiatric/Behavioral: Negative.       Objective   Vitals:    05/09/25 1024   BP: 124/82   Pulse: 87   Temp: 36.1 °C (97 °F)   SpO2: 98%      Body mass index is 28.72 kg/m².  Physical Exam  Vitals reviewed.   Constitutional:       Appearance: Normal appearance.   HENT:      Head: Normocephalic and atraumatic.      Nose: Nose normal.      Mouth/Throat:      Mouth: Mucous membranes are moist.      Pharynx: Oropharynx is clear.   Eyes:      Extraocular Movements: Extraocular movements intact.      Pupils: Pupils are equal, round, and reactive to light.   Cardiovascular:      Rate and Rhythm: Normal rate and regular rhythm.      Pulses: Normal pulses.      Heart sounds: Normal heart sounds.   Pulmonary:      Effort: Pulmonary effort is normal.      Breath sounds: Normal breath sounds.   Abdominal:      General: Bowel sounds are normal.      Palpations: Abdomen is soft.   Musculoskeletal:         General: Normal range of motion.      Cervical back: Normal range of motion and neck supple.      Comments: No pain with palpation of left middle finger  No tendonopathy    Skin:     General: Skin is warm and dry.       "Capillary Refill: Capillary refill takes less than 2 seconds.   Neurological:      General: No focal deficit present.      Mental Status: He is alert and oriented to person, place, and time.   Psychiatric:         Mood and Affect: Mood normal.         Behavior: Behavior normal.         Thought Content: Thought content normal.         Judgment: Judgment normal.     Diagnostic Results   Lab Results   Component Value Date    GLUCOSE 110 (H) 05/07/2025    CALCIUM 9.3 05/07/2025     05/07/2025    K 3.7 05/07/2025    CO2 32 05/07/2025     05/07/2025    BUN 16 05/07/2025    CREATININE 1.00 05/07/2025     Lab Results   Component Value Date    ALT 13 05/07/2025    AST 14 05/07/2025    ALKPHOS 66 05/07/2025    BILITOT 0.7 05/07/2025     Lab Results   Component Value Date    WBC 5.9 05/08/2024    HGB 15.8 05/08/2024    HCT 46.8 05/08/2024    MCV 87 05/08/2024     05/08/2024     Lab Results   Component Value Date    CHOL 139 05/07/2025    CHOL 167 12/13/2023    CHOL 144 04/18/2023     Lab Results   Component Value Date    HDL 37 (L) 05/07/2025    HDL 39.0 (L) 12/13/2023    HDL 35 (L) 04/18/2023     Lab Results   Component Value Date    LDLCALC 79 05/07/2025    LDLCALC 100 12/13/2023    LDLCALC 84 04/18/2023     Lab Results   Component Value Date    TRIG 130 05/07/2025    TRIG 140 12/13/2023    TRIG 123 04/18/2023     No components found for: \"CHOLHDL\"  Lab Results   Component Value Date    HGBA1C 5.8 (H) 05/07/2025     Other labs not included in the list above reviewed either before or during this encounter.    History   Medical History[1]  Surgical History[2]  Family History[3]  Social History     Socioeconomic History   • Marital status:      Spouse name: Not on file   • Number of children: Not on file   • Years of education: Not on file   • Highest education level: Not on file   Occupational History   • Not on file   Tobacco Use   • Smoking status: Never     Passive exposure: Past   • Smokeless " tobacco: Never   Vaping Use   • Vaping status: Never Used   Substance and Sexual Activity   • Alcohol use: Yes   • Drug use: Never   • Sexual activity: Not on file   Other Topics Concern   • Not on file   Social History Narrative   • Not on file     Social Drivers of Health     Financial Resource Strain: Not on file   Food Insecurity: Not on file   Transportation Needs: Not on file   Physical Activity: Not on file   Stress: Not on file   Social Connections: Not on file   Intimate Partner Violence: Not on file   Housing Stability: Not on file     Allergies[4]  Medications Ordered Prior to Encounter[5]  Immunization History   Administered Date(s) Administered   • COVID-19, mRNA, LNP-S, PF, 30 mcg/0.3 mL dose 03/02/2021, 03/30/2021   • Flu vaccine (IIV4), preservative free *Check age/dose* 11/20/2020   • Flu vaccine, quadrivalent, high-dose, preservative free, age 65y+ (FLUZONE) 10/06/2021, 10/06/2022   • Flu vaccine, trivalent, preservative free, age 6 months and greater (Fluarix/Fluzone/Flulaval) 11/20/2013, 10/24/2014   • Influenza, Seasonal, Quadrivalent, Adjuvanted 09/02/2023   • Influenza, Unspecified 09/27/2018   • Influenza, injectable, quadrivalent 12/08/2017, 12/01/2019, 11/17/2020   • Influenza, seasonal, injectable 11/17/2010   • Influenza, trivalent, adjuvanted 01/16/2025   • Novel influenza-H1N1-09, preservative-free 12/17/2009   • Pfizer COVID-19 vaccine, 12 years and older, (30mcg/0.3mL) (Comirnaty) 01/16/2025   • Pfizer COVID-19 vaccine, bivalent, age 12 years and older (30 mcg/0.3 mL) 01/11/2023   • Pfizer Purple Cap SARS-CoV-2 03/03/2021, 10/11/2021   • Pneumococcal conjugate vaccine, 13-valent (PREVNAR 13) 01/04/2018   • Pneumococcal polysaccharide vaccine, 23-valent, age 2 years and older (PNEUMOVAX 23) 10/01/2018, 01/10/2019   • RESPIRATORY SYNCYTIAL VIRUS (RSV), ELIGIBLE PREGNANT PTS, 0.5 ML (ABRYSVO) 09/02/2023   • Tdap vaccine, age 7 year and older (BOOSTRIX, ADACEL) 12/01/2014   • Zoster, live  12/04/2014, 01/01/2015     Patient's medical history was reviewed and updated either before or during this encounter.     Thalia Beltrán, APRN-CNP           [1]  Past Medical History:  Diagnosis Date   • Erectile dysfunction 08/28/2023    implant    • Essential hypertension 08/28/2023 4/23 ECG NSR   • Gastro-esophageal reflux disease with esophagitis, without bleeding 08/28/2023    EGD 10/22 Dr.Gallagher MALONEY.   • Impaired fasting glucose 08/28/2023   • Memory impairment 07/10/2018    mild; frequent difficulty finding words; nl TSH/B12/fol/RPR 1/3/19   • Personal history of other diseases of the circulatory system     History of hypertension   • Peyronie disease 08/28/2023   • Pure hypercholesterolemia 08/28/2023   • Screening for prostate cancer     10/22 PSA 2.8 [facility]  12/23 [facility], 12/23 PSA 4.0 recheck 2/24 PSA/free PSA  2.3   • Screening for prostate cancer 02/22/2024 2/24 PSA 2.3   [2]  History reviewed. No pertinent surgical history.  [3]  Family History  Problem Relation Name Age of Onset   • Heart disease Mother     • Heart attack Father     • No Known Problems Daughter     • No Known Problems Sibling          1 brother 1 sister   [4]  Allergies  Allergen Reactions   • Pollen Extracts Other   • Grass Pollen Other   [5]  Current Outpatient Medications on File Prior to Visit   Medication Sig Dispense Refill   • Al hyd-Mg tr-alg ac-sod bicarb (Gaviscon) 80-14.2 mg tablet,chewable Chew.     • amLODIPine (Norvasc) 10 mg tablet TAKE 1 TABLET BY MOUTH EVERY DAY 30 tablet 1   • azelastine (Astelin) 137 mcg (0.1 %) nasal spray INSTILL 1 SPRAY IN EACH NOSTRIL 2 TIMES A DAY AS DIRECTED (Patient taking differently: Administer 1 spray into each nostril once daily.) 30 mL 0   • famotidine (Pepcid) 20 mg tablet Take by mouth.     • fluticasone (Flonase) 50 mcg/actuation nasal spray Administer 1 spray into each nostril once daily. Shake gently. Before first use, prime pump. After use, clean  tip and replace cap. (Patient taking differently: Administer 2 sprays into each nostril once daily. Shake gently. Before first use, prime pump. After use, clean tip and replace cap.) 48 g 0   • loratadine (Claritin) 10 mg tablet Take 1 tablet (10 mg) by mouth once daily.     • losartan-hydrochlorothiazide (Hyzaar) 100-25 mg tablet TAKE 1 TABLET BY MOUTH EVERY DAY 90 tablet 3   • multivitamin tablet Take 1 tablet by mouth once daily.     • pantoprazole (ProtoNix) 40 mg EC tablet Take 1 tablet (40 mg) by mouth once daily. Do not crush, chew, or split. 30 tablet 11   • pravastatin (Pravachol) 40 mg tablet TAKE 1 TABLET BY MOUTH AT NIGHT 90 tablet 3   • psyllium husk (METAMUCIL ORAL) Take 1 tablet by mouth.     • triamcinolone (Kenalog) 0.1 % ointment Apply topically 2 times a day. (Patient not taking: Reported on 5/9/2025)     • [DISCONTINUED] pravastatin (Pravachol) 40 mg tablet TAKE 1 TABLET BY MOUTH AT NIGHT 90 tablet 2     No current facility-administered medications on file prior to visit.

## 2025-05-09 ENCOUNTER — OFFICE VISIT (OUTPATIENT)
Dept: PRIMARY CARE | Facility: CLINIC | Age: 75
End: 2025-05-09
Payer: MEDICARE

## 2025-05-09 VITALS
DIASTOLIC BLOOD PRESSURE: 82 MMHG | SYSTOLIC BLOOD PRESSURE: 124 MMHG | HEART RATE: 87 BPM | TEMPERATURE: 97 F | OXYGEN SATURATION: 98 % | HEIGHT: 71 IN | WEIGHT: 203 LBS | BODY MASS INDEX: 28.42 KG/M2

## 2025-05-09 DIAGNOSIS — Z00.00 ROUTINE GENERAL MEDICAL EXAMINATION AT HEALTH CARE FACILITY: ICD-10-CM

## 2025-05-09 DIAGNOSIS — M65.332 TRIGGER MIDDLE FINGER OF LEFT HAND: ICD-10-CM

## 2025-05-09 DIAGNOSIS — Z00.00 ENCOUNTER FOR MEDICARE ANNUAL WELLNESS EXAM: Primary | ICD-10-CM

## 2025-05-09 DIAGNOSIS — K21.00 GASTRO-ESOPHAGEAL REFLUX DISEASE WITH ESOPHAGITIS, WITHOUT BLEEDING: ICD-10-CM

## 2025-05-09 DIAGNOSIS — L50.8 CHRONIC URTICARIA: ICD-10-CM

## 2025-05-09 DIAGNOSIS — E78.2 MIXED HYPERLIPIDEMIA: ICD-10-CM

## 2025-05-09 DIAGNOSIS — I10 ESSENTIAL HYPERTENSION: ICD-10-CM

## 2025-05-09 DIAGNOSIS — J31.0 CHRONIC RHINITIS: ICD-10-CM

## 2025-05-09 DIAGNOSIS — Z23 ENCOUNTER FOR IMMUNIZATION: ICD-10-CM

## 2025-05-09 DIAGNOSIS — R73.01 IMPAIRED FASTING GLUCOSE: ICD-10-CM

## 2025-05-09 PROBLEM — R31.29 MICROSCOPIC HEMATURIA: Status: RESOLVED | Noted: 2020-01-17 | Resolved: 2025-05-09

## 2025-05-09 PROCEDURE — 1126F AMNT PAIN NOTED NONE PRSNT: CPT | Performed by: NURSE PRACTITIONER

## 2025-05-09 PROCEDURE — 3008F BODY MASS INDEX DOCD: CPT | Performed by: NURSE PRACTITIONER

## 2025-05-09 PROCEDURE — 1036F TOBACCO NON-USER: CPT | Performed by: NURSE PRACTITIONER

## 2025-05-09 PROCEDURE — G0439 PPPS, SUBSEQ VISIT: HCPCS | Performed by: NURSE PRACTITIONER

## 2025-05-09 PROCEDURE — 99215 OFFICE O/P EST HI 40 MIN: CPT | Performed by: NURSE PRACTITIONER

## 2025-05-09 PROCEDURE — 1159F MED LIST DOCD IN RCRD: CPT | Performed by: NURSE PRACTITIONER

## 2025-05-09 PROCEDURE — 90471 IMMUNIZATION ADMIN: CPT | Performed by: NURSE PRACTITIONER

## 2025-05-09 PROCEDURE — 3074F SYST BP LT 130 MM HG: CPT | Performed by: NURSE PRACTITIONER

## 2025-05-09 PROCEDURE — 3079F DIAST BP 80-89 MM HG: CPT | Performed by: NURSE PRACTITIONER

## 2025-05-09 ASSESSMENT — PATIENT HEALTH QUESTIONNAIRE - PHQ9
2. FEELING DOWN, DEPRESSED OR HOPELESS: NOT AT ALL
1. LITTLE INTEREST OR PLEASURE IN DOING THINGS: NOT AT ALL
SUM OF ALL RESPONSES TO PHQ9 QUESTIONS 1 AND 2: 0

## 2025-05-09 ASSESSMENT — ENCOUNTER SYMPTOMS
RESPIRATORY NEGATIVE: 1
CARDIOVASCULAR NEGATIVE: 1
ENDOCRINE NEGATIVE: 1
ALLERGIC/IMMUNOLOGIC NEGATIVE: 1
CONSTITUTIONAL NEGATIVE: 1
EYES NEGATIVE: 1
GASTROINTESTINAL NEGATIVE: 1
NEUROLOGICAL NEGATIVE: 1
PSYCHIATRIC NEGATIVE: 1
HEMATOLOGIC/LYMPHATIC NEGATIVE: 1

## 2025-05-09 ASSESSMENT — PAIN SCALES - GENERAL: PAINLEVEL_OUTOF10: 0-NO PAIN

## 2025-05-20 DIAGNOSIS — I10 BENIGN HYPERTENSION: ICD-10-CM

## 2025-05-20 RX ORDER — AMLODIPINE BESYLATE 10 MG/1
10 TABLET ORAL DAILY
Qty: 90 TABLET | Refills: 3 | Status: SHIPPED | OUTPATIENT
Start: 2025-05-20

## 2025-05-20 NOTE — TELEPHONE ENCOUNTER
Refill - [facility] - amlodipine 10 mg    Pt requests 90 day supply w/ refills     Lv 5/9/25 nv 11/14/25

## 2025-06-13 DIAGNOSIS — Z88.9 MULTIPLE ALLERGIES: ICD-10-CM

## 2025-06-13 RX ORDER — FLUTICASONE PROPIONATE 50 MCG
1 SPRAY, SUSPENSION (ML) NASAL DAILY
Qty: 48 G | Refills: 0 | Status: SHIPPED | OUTPATIENT
Start: 2025-06-13

## 2025-07-18 ENCOUNTER — APPOINTMENT (OUTPATIENT)
Dept: OTOLARYNGOLOGY | Facility: CLINIC | Age: 75
End: 2025-07-18
Payer: MEDICARE

## 2025-07-30 DIAGNOSIS — Z88.9 MULTIPLE ALLERGIES: ICD-10-CM

## 2025-07-31 RX ORDER — AZELASTINE 1 MG/ML
SPRAY, METERED NASAL
Qty: 30 ML | Refills: 0 | Status: SHIPPED | OUTPATIENT
Start: 2025-07-31

## 2025-08-04 ENCOUNTER — TELEPHONE (OUTPATIENT)
Dept: PRIMARY CARE | Facility: CLINIC | Age: 75
End: 2025-08-04
Payer: MEDICARE

## 2025-08-04 DIAGNOSIS — Z88.9 MULTIPLE ALLERGIES: ICD-10-CM

## 2025-08-04 RX ORDER — FLUTICASONE PROPIONATE 50 MCG
2 SPRAY, SUSPENSION (ML) NASAL DAILY
Qty: 48 G | Refills: 2 | Status: SHIPPED | OUTPATIENT
Start: 2025-08-04

## 2025-08-04 NOTE — TELEPHONE ENCOUNTER
Pt requests refill of fluticisone. Pt states when he saw Dr Alberto VALLES last October, he was told to use 2 puffs per nostril daily instead of 1.  Pt is out of medication and pharmacy notes too early for refill ( and out of refills)    Pt requests new rx with updated dosing please.     [facility] in [place]

## 2025-08-04 NOTE — TELEPHONE ENCOUNTER
It was filled in June with instructions of 1 puff per nostril daily. Pt has been using 2 puffs per nostril daily so ran out early.

## 2025-08-18 ENCOUNTER — APPOINTMENT (OUTPATIENT)
Dept: OTOLARYNGOLOGY | Facility: CLINIC | Age: 75
End: 2025-08-18
Payer: MEDICARE

## 2025-08-18 VITALS — WEIGHT: 205 LBS | TEMPERATURE: 97.3 F | BODY MASS INDEX: 28.7 KG/M2 | HEIGHT: 71 IN

## 2025-08-18 DIAGNOSIS — H61.23 BILATERAL IMPACTED CERUMEN: ICD-10-CM

## 2025-08-18 DIAGNOSIS — K21.9 GERD WITHOUT ESOPHAGITIS: ICD-10-CM

## 2025-08-18 DIAGNOSIS — R09.A2 GLOBUS SENSATION: Primary | ICD-10-CM

## 2025-08-18 DIAGNOSIS — J30.9 ALLERGIC RHINITIS, UNSPECIFIED SEASONALITY, UNSPECIFIED TRIGGER: ICD-10-CM

## 2025-08-18 DIAGNOSIS — Z88.9 MULTIPLE ALLERGIES: ICD-10-CM

## 2025-08-18 DIAGNOSIS — K21.00 GASTROESOPHAGEAL REFLUX DISEASE WITH ESOPHAGITIS WITHOUT HEMORRHAGE: ICD-10-CM

## 2025-08-18 PROCEDURE — 99214 OFFICE O/P EST MOD 30 MIN: CPT | Performed by: OTOLARYNGOLOGY

## 2025-08-18 PROCEDURE — 69210 REMOVE IMPACTED EAR WAX UNI: CPT | Performed by: OTOLARYNGOLOGY

## 2025-08-18 PROCEDURE — 1036F TOBACCO NON-USER: CPT | Performed by: OTOLARYNGOLOGY

## 2025-08-18 PROCEDURE — 1159F MED LIST DOCD IN RCRD: CPT | Performed by: OTOLARYNGOLOGY

## 2025-08-18 PROCEDURE — 3008F BODY MASS INDEX DOCD: CPT | Performed by: OTOLARYNGOLOGY

## 2025-08-18 RX ORDER — FLUTICASONE PROPIONATE 50 MCG
2 SPRAY, SUSPENSION (ML) NASAL DAILY
Qty: 48 G | Refills: 3 | Status: SHIPPED | OUTPATIENT
Start: 2025-08-18

## 2025-08-18 RX ORDER — PANTOPRAZOLE SODIUM 40 MG/1
40 TABLET, DELAYED RELEASE ORAL DAILY
Qty: 90 TABLET | Refills: 3 | Status: SHIPPED | OUTPATIENT
Start: 2025-08-18 | End: 2026-08-18